# Patient Record
Sex: MALE | Race: WHITE | Employment: OTHER | URBAN - METROPOLITAN AREA
[De-identification: names, ages, dates, MRNs, and addresses within clinical notes are randomized per-mention and may not be internally consistent; named-entity substitution may affect disease eponyms.]

---

## 2017-08-17 ENCOUNTER — NURSE ONLY (OUTPATIENT)
Dept: FAMILY MEDICINE CLINIC | Facility: CLINIC | Age: 62
End: 2017-08-17

## 2017-08-17 VITALS
OXYGEN SATURATION: 96 % | TEMPERATURE: 98 F | HEART RATE: 74 BPM | SYSTOLIC BLOOD PRESSURE: 120 MMHG | RESPIRATION RATE: 20 BRPM | DIASTOLIC BLOOD PRESSURE: 74 MMHG

## 2017-08-17 DIAGNOSIS — J40 BRONCHITIS: ICD-10-CM

## 2017-08-17 DIAGNOSIS — J01.00 ACUTE NON-RECURRENT MAXILLARY SINUSITIS: Primary | ICD-10-CM

## 2017-08-17 PROCEDURE — 99203 OFFICE O/P NEW LOW 30 MIN: CPT

## 2017-08-17 RX ORDER — DIAZEPAM 5 MG/1
TABLET ORAL
COMMUNITY
Start: 2017-01-12 | End: 2019-02-05 | Stop reason: ALTCHOICE

## 2017-08-17 RX ORDER — CEFDINIR 300 MG/1
300 CAPSULE ORAL 2 TIMES DAILY
Qty: 20 CAPSULE | Refills: 0 | Status: SHIPPED | OUTPATIENT
Start: 2017-08-17 | End: 2017-08-27

## 2017-08-17 RX ORDER — DICLOFENAC SODIUM 75 MG/1
75 TABLET, DELAYED RELEASE ORAL
COMMUNITY
Start: 2017-03-29 | End: 2019-06-21

## 2017-08-17 NOTE — PROGRESS NOTES
CHIEF COMPLAINT:   Patient presents with:  Cough/URI: 8/11/17    HPI:   Jessi Reed is a 64year old male who presents with upper respiratory symptoms for  6  days.  Patient reports congestion, cough with yellow to brown tinged colored sputum, cough is n • CAD (coronary artery disease)     nonobstructive   • Depression    • DJD (degenerative joint disease)    • Erectile dysfunction    • Meniere's disease    • Spontaneous pneumothorax     age 19's and again age 29's, treated with chest tube on first episode NOSE: Nasal passages erythematous and swollen, maxil sinus tenderness bilat   THROAT: oral mucosa pink and moist; posterior pharynx red with central mild cobblestoning/mucoid; tonsils not present; no exudate   NECK: supple, non-tender  LUNGS: clear to ausc · Take the full course of antibiotics as instructed. Do not stop taking them, even if you feel better. · Drink plenty of water, hot tea, and other liquids. This may help thin mucus. It also may promote sinus drainage.   · Heat may help soothe painful areas Call your healthcare provider if any of these occur:  · Facial pain or headache becoming more severe  · Stiff neck  · Unusual drowsiness or confusion  · Swelling of the forehead or eyelids  · Vision problems, including blurred or double vision  · Fever of

## 2017-08-21 ENCOUNTER — OFFICE VISIT (OUTPATIENT)
Dept: FAMILY MEDICINE CLINIC | Facility: CLINIC | Age: 62
End: 2017-08-21

## 2017-08-21 VITALS
HEART RATE: 88 BPM | SYSTOLIC BLOOD PRESSURE: 102 MMHG | OXYGEN SATURATION: 97 % | RESPIRATION RATE: 20 BRPM | TEMPERATURE: 98 F | DIASTOLIC BLOOD PRESSURE: 62 MMHG

## 2017-08-21 DIAGNOSIS — J40 BRONCHITIS: Primary | ICD-10-CM

## 2017-08-21 PROCEDURE — 94640 AIRWAY INHALATION TREATMENT: CPT

## 2017-08-21 PROCEDURE — 99213 OFFICE O/P EST LOW 20 MIN: CPT

## 2017-08-21 RX ORDER — ALBUTEROL SULFATE 2.5 MG/3ML
2.5 SOLUTION RESPIRATORY (INHALATION) ONCE
Status: COMPLETED | OUTPATIENT
Start: 2017-08-21 | End: 2017-08-21

## 2017-08-21 RX ORDER — PREDNISONE 20 MG/1
TABLET ORAL
Qty: 12 TABLET | Refills: 0 | Status: SHIPPED | OUTPATIENT
Start: 2017-08-21 | End: 2018-06-18 | Stop reason: ALTCHOICE

## 2017-08-21 RX ORDER — DOXYCYCLINE HYCLATE 100 MG
100 TABLET ORAL 2 TIMES DAILY
Qty: 20 TABLET | Refills: 0 | Status: SHIPPED | OUTPATIENT
Start: 2017-08-21 | End: 2018-05-31 | Stop reason: ALTCHOICE

## 2017-08-21 RX ORDER — GUAIFENESIN AND CODEINE PHOSPHATE 100; 10 MG/5ML; MG/5ML
SOLUTION ORAL
Qty: 180 ML | Refills: 0 | Status: SHIPPED | OUTPATIENT
Start: 2017-08-21 | End: 2018-05-31 | Stop reason: ALTCHOICE

## 2017-08-21 RX ADMIN — ALBUTEROL SULFATE 2.5 MG: 2.5 SOLUTION RESPIRATORY (INHALATION) at 18:00:00

## 2017-08-22 ENCOUNTER — TELEPHONE (OUTPATIENT)
Dept: FAMILY MEDICINE CLINIC | Facility: CLINIC | Age: 62
End: 2017-08-22

## 2017-08-22 NOTE — PROGRESS NOTES
CHIEF COMPLAINT:   Patient presents with:  Cough/URI: since8/11/17    HPI:   Azucena Perez is a 64year old male who presents with upper respiratory symptoms for  11  days.  Patient reports cough with white to yellowish colored sputum, cough is keeping pt Tab Take 1 tablet by mouth daily as needed for Erectile Dysfunction. Disp: 30 tablet Rfl: 1   Probiotic Product Oral Cap Take 2 capsules by mouth daily.  Disp: 60 capsule Rfl: 0   ASPIRIN 81 MG OR TABS 1 TABLET DAILY Disp:  Rfl:       Past Medical History: SpO2 97%    6:30PM Pulse  Ox 96% after Albuterol Neb 0.083%  GENERAL: well developed, well nourished,in no apparent distress.  Able to speak in complete sentences/ no conversational dyspnea  SKIN: no rashes,no suspicious lesions  EYES: conjunctiva non-injec Minimum per day. Risks, benefits, and side effects of medication explained and discussed.     Patient Instructions   Discontinue your antibiotic, Cefdinir and start new one, Doxycycline,  Use Prednisone over the next 6 days, Continue to use the Albuterol

## 2017-08-22 NOTE — PATIENT INSTRUCTIONS
Discontinue your antibiotic, Cefdinir and start new one, Doxycycline,  Use Prednisone over the next 6 days, Continue to use the Albuterol inhaler 4 times a day for the cough. Use cough syrup at night.   Follow up with PCP in 2 days, as you may need a CXR,

## 2017-08-22 NOTE — TELEPHONE ENCOUNTER
Pt states he slept better last night and that he feels a little better. He took the Prednisone 60mg and then took it again 12 hours later, instead of waiting until 24 hours from prior dosing.  Instructed pt that he needs to doni his PCP today for a follow u

## 2017-12-16 ENCOUNTER — OFFICE VISIT (OUTPATIENT)
Dept: FAMILY MEDICINE CLINIC | Facility: CLINIC | Age: 62
End: 2017-12-16

## 2017-12-16 VITALS
OXYGEN SATURATION: 98 % | DIASTOLIC BLOOD PRESSURE: 72 MMHG | TEMPERATURE: 98 F | RESPIRATION RATE: 22 BRPM | HEART RATE: 88 BPM | SYSTOLIC BLOOD PRESSURE: 118 MMHG

## 2017-12-16 DIAGNOSIS — J01.00 ACUTE NON-RECURRENT MAXILLARY SINUSITIS: ICD-10-CM

## 2017-12-16 DIAGNOSIS — Z91.09 ENVIRONMENTAL ALLERGIES: ICD-10-CM

## 2017-12-16 DIAGNOSIS — J40 BRONCHITIS: Primary | ICD-10-CM

## 2017-12-16 PROCEDURE — 99213 OFFICE O/P EST LOW 20 MIN: CPT

## 2017-12-16 RX ORDER — CEFDINIR 300 MG/1
300 CAPSULE ORAL 2 TIMES DAILY
Qty: 20 CAPSULE | Refills: 0 | Status: SHIPPED | OUTPATIENT
Start: 2017-12-16 | End: 2017-12-26

## 2017-12-16 RX ORDER — GUAIFENESIN AND CODEINE PHOSPHATE 100; 10 MG/5ML; MG/5ML
SOLUTION ORAL
Qty: 150 ML | Refills: 0 | Status: SHIPPED | OUTPATIENT
Start: 2017-12-16 | End: 2018-05-31 | Stop reason: ALTCHOICE

## 2017-12-16 NOTE — PROGRESS NOTES
CHIEF COMPLAINT:   Patient presents with:  Cough/URI: 1 week    HPI:   Skye Terry is a 58year old male who presents with upper respiratory symptoms for  1  weeks, and cough is getting worse.  Add a sever episode like this in August, 2017 with \"mild pn Sildenafil Citrate (VIAGRA) 50 MG Oral Tab Take 1 tablet by mouth daily as needed for Erectile Dysfunction.  Disp: 30 tablet Rfl: 1      Past Medical History:   Diagnosis Date   • BPH    • CAD (coronary artery disease)     nonobstructive   • Depression    • EYES: conjunctiva non-injected; no drainage  EARS: TM's -hazy, not red, mild bulging, no retraction, opaque to hazy fluid, bony landmarks are not clearly visible  NOSE: Nasal passages erythematous and swollen with maxillary sinus pressure/pain  THROAT: ora As the link between the middle ear and the throat, the eustachian tube has 2 roles. It helps drain normal, cleansing moisture from the middle ear. It also controls air pressure inside the middle ear chamber. When you swallow, the eustachian tube opens.  Cleatrice Reasons · Talk with your healthcare provider about starting a regular exercise program. Exercise is a great way to reduce stress.  Include an aerobic activity (such as walking, jogging, bicycling, or swimming) in your exercise program.  · Take time out from your da · Ask your healthcare provider about visualization techniques, deep-breathing exercises, stretching, yoga, meditation, and biofeedback. These are all ways to help reduce stress. Date Last Reviewed: 11/1/2016  © 6398-6843 [employer] [address].  [address] A physical exam, health history, and certain tests help your healthcare provider make the diagnosis. Health history  Your healthcare provider will ask you about your symptoms. The exam  Your provider listens Meaghan Balderas chest for signs of congestion.  He or s Most cases of bronchitis are caused by cold or flu viruses. They don’t need antibiotics to treat them, even if your mucus is thick and green or yellow.  Antibiotics don’t treat viral illness and antibiotics have not been shown to have any benefit in cases o © 0454-1924 [employer] [address]. 1407 [facility], [facility] [place]. All rights reserved. This information is not intended as a substitute for professional medical care. Always follow your healthcare professional's instructions.       Use Albut

## 2017-12-16 NOTE — PATIENT INSTRUCTIONS
The Middle Ear  The middle ear is an air-filled chamber that lies behind the eardrum. Pressure in the middle ear changes to match air pressure outside of the eardrum.  When inside and outside pressures are balanced, the eardrum is flexible and normal hear Certain changes may help you manage Meniere’s disease. Some of these changes are minor. Others require more dedication, such as cutting down on stress.   Reduce Your Stress  Stress doesn’t cause Meniere’s disease, but it may cause symptoms or make them wors · Pay attention to what makes you feel tense. Note how your body responds to tension. “Listen” to your body for signs such as stomach upsets, tensed muscles, clenched teeth, or other symptoms.   · Talk with your healthcare provider about starting a regular Bronchitis often occurs with a cold or the flu virus. The airways become inflamed (red and swollen). There is a deep hacking cough from the extra mucus.  Other symptoms may include:  · Wheezing  · Chest discomfort  · Shortness of breath  · Mild fever  A sec · Sit up or use extra pillows when in bed. This helps to lessen coughing and congestion. · Ask your provider about using medicine. Ask about using cough medicine, pain and fever medicine, or a decongestant.   Antibiotics  Most cases of bronchitis are cause · Breathing problems worsen or  become severe  · Symptoms don’t get better within a week, or within 3 days of taking antibiotics   Date Last Reviewed: 2/1/2017  © 8216-3158 [employer] [address]. 1407 [facility], [address] [place] [address].  All rights re

## 2018-06-12 PROBLEM — R10.31 RIGHT INGUINAL PAIN: Status: ACTIVE | Noted: 2018-06-12

## 2018-06-18 ENCOUNTER — NURSE ONLY (OUTPATIENT)
Dept: FAMILY MEDICINE CLINIC | Facility: CLINIC | Age: 63
End: 2018-06-18

## 2018-06-18 VITALS
SYSTOLIC BLOOD PRESSURE: 108 MMHG | HEART RATE: 72 BPM | DIASTOLIC BLOOD PRESSURE: 62 MMHG | RESPIRATION RATE: 14 BRPM | OXYGEN SATURATION: 98 %

## 2018-06-18 DIAGNOSIS — L03.011 ACUTE PARONYCHIA OF FINGER, RIGHT: Primary | ICD-10-CM

## 2018-06-18 PROCEDURE — 99212 OFFICE O/P EST SF 10 MIN: CPT | Performed by: NURSE PRACTITIONER

## 2018-06-18 RX ORDER — CEPHALEXIN 500 MG/1
500 CAPSULE ORAL 3 TIMES DAILY
Qty: 21 CAPSULE | Refills: 0 | Status: SHIPPED | OUTPATIENT
Start: 2018-06-18 | End: 2018-06-25

## 2018-06-18 NOTE — PATIENT INSTRUCTIONS
Paronychia of the Finger or Toe  Paronychia is an infection near a fingernail or toenail. It usually occurs when an opening in the cuticle or an ingrown toenail lets bacteria under the skin. The infection will need to be drained if pus is present.  If th · Fever of 100.4ºF (38ºC) or higher, or as directed by your provider  Date Last Reviewed: 8/1/2016  © 0708-5272 [employer] [address]. 1407 [facility], [address] [place] [address]. All rights reserved.  This information is not intended as a substitute for

## 2018-06-18 NOTE — PROGRESS NOTES
CHIEF COMPLAINT:   Patient presents with:  Rash Skin Problem (integumentary)      HPI:     Claudia De La Cruz is a 58year old male who presents with concerns of skin infection of the right 2nd finger along the nailbed.  Patient first noticed symptoms approxima Smokeless tobacco: Never Used                      Alcohol use: Yes              Comment: COUPLE TIMES PER YEAR       REVIEW OF SYSTEMS:   GENERAL: feels well otherwise, no fever, no chills. SKIN: as above. LYMPH: denies enlargement of the lymph nodes. The infection will need to be drained if pus is present. If the infection has been caught early, you may need only antibiotic treatment. Healing will take about 1 to 2 weeks.   Home care  Follow these guidelines when caring for yourself at home:  · Clean an © 7548-0364 [employer]. 1407 [facility], [facility] [place]. All rights reserved. This information is not intended as a substitute for professional medical care. Always follow your healthcare professional's instructions.             The

## 2018-10-22 ENCOUNTER — OFFICE VISIT (OUTPATIENT)
Dept: FAMILY MEDICINE CLINIC | Facility: CLINIC | Age: 63
End: 2018-10-22
Payer: COMMERCIAL

## 2018-10-22 VITALS
DIASTOLIC BLOOD PRESSURE: 72 MMHG | HEART RATE: 69 BPM | OXYGEN SATURATION: 97 % | SYSTOLIC BLOOD PRESSURE: 120 MMHG | TEMPERATURE: 98 F

## 2018-10-22 DIAGNOSIS — J02.8 ACUTE PHARYNGITIS DUE TO OTHER SPECIFIED ORGANISMS: Primary | ICD-10-CM

## 2018-10-22 PROCEDURE — 99213 OFFICE O/P EST LOW 20 MIN: CPT

## 2018-10-22 PROCEDURE — 87081 CULTURE SCREEN ONLY: CPT

## 2018-10-22 PROCEDURE — 87880 STREP A ASSAY W/OPTIC: CPT

## 2018-10-22 RX ORDER — AMOXICILLIN 875 MG/1
875 TABLET, COATED ORAL 2 TIMES DAILY
Qty: 20 TABLET | Refills: 0 | Status: SHIPPED | OUTPATIENT
Start: 2018-10-22 | End: 2019-02-05 | Stop reason: ALTCHOICE

## 2018-10-23 NOTE — PROGRESS NOTES
Marielena Gil is a 58year old male. CHIEF COMPLAINT:   Patient presents with:  Sore Throat: 10/18/18, now with tender glands, HA and chest congestion      HPI:   Patient presents with 3-4 day history of sore throat.   Patient reports the following associ Sig: Take 1 tablet (875 mg total) by mouth 2 (two) times daily.        Comfort measures explained and discussed as listed in Patient Instructions    Follow up in 3-5 days if not improving, condition worsens, or fever greater than or equal to 100.4 persist · Use the antibiotic medicine for the full 10 days. Do not stop the medicine even if you or your child feel better. This is very important to make sure the infection is fully treated. It is also important to prevent medicine-resistant germs from growing.  I ? Your child is younger than 3years of age and has a fever of 100.4°F (38°C) for more than 1 day. ? Your child is 3years old or older and has a fever of 100.4°F (38°C) for more than 3 days.   · New or worsening ear pain, sinus pain, or headache  · Painfu

## 2018-12-07 ENCOUNTER — HOSPITAL ENCOUNTER (EMERGENCY)
Facility: HOSPITAL | Age: 63
Discharge: HOME | End: 2018-12-07
Attending: EMERGENCY MEDICINE
Payer: COMMERCIAL

## 2018-12-07 ENCOUNTER — OFFICE VISIT (OUTPATIENT)
Dept: FAMILY MEDICINE CLINIC | Facility: CLINIC | Age: 63
End: 2018-12-07
Payer: COMMERCIAL

## 2018-12-07 ENCOUNTER — APPOINTMENT (OUTPATIENT)
Dept: GENERAL RADIOLOGY | Facility: HOSPITAL | Age: 63
End: 2018-12-07
Attending: EMERGENCY MEDICINE
Payer: COMMERCIAL

## 2018-12-07 VITALS
OXYGEN SATURATION: 98 % | HEART RATE: 60 BPM | SYSTOLIC BLOOD PRESSURE: 120 MMHG | TEMPERATURE: 98 F | RESPIRATION RATE: 16 BRPM | DIASTOLIC BLOOD PRESSURE: 70 MMHG

## 2018-12-07 VITALS
HEIGHT: 69 IN | OXYGEN SATURATION: 98 % | SYSTOLIC BLOOD PRESSURE: 121 MMHG | WEIGHT: 218 LBS | BODY MASS INDEX: 32.29 KG/M2 | HEART RATE: 62 BPM | DIASTOLIC BLOOD PRESSURE: 81 MMHG | RESPIRATION RATE: 21 BRPM | TEMPERATURE: 99 F

## 2018-12-07 DIAGNOSIS — Z02.9 ENCOUNTERS FOR ADMINISTRATIVE PURPOSE: Primary | ICD-10-CM

## 2018-12-07 DIAGNOSIS — J40 BRONCHITIS: Primary | ICD-10-CM

## 2018-12-07 PROCEDURE — 71046 X-RAY EXAM CHEST 2 VIEWS: CPT | Performed by: EMERGENCY MEDICINE

## 2018-12-07 PROCEDURE — 93010 ELECTROCARDIOGRAM REPORT: CPT | Performed by: EMERGENCY MEDICINE

## 2018-12-07 PROCEDURE — 93005 ELECTROCARDIOGRAM TRACING: CPT

## 2018-12-07 PROCEDURE — 99284 EMERGENCY DEPT VISIT MOD MDM: CPT

## 2018-12-07 RX ORDER — CEPHALEXIN 500 MG/1
500 CAPSULE ORAL 3 TIMES DAILY
Qty: 21 CAPSULE | Refills: 0 | Status: SHIPPED | OUTPATIENT
Start: 2018-12-07 | End: 2018-12-14

## 2018-12-07 NOTE — ED INITIAL ASSESSMENT (HPI)
Sent from [address] [place] [address] for \"chest tightness\" experienced when coughing. Pt states he has been around sick people recently, his mother in law has pneumonia, and he has been experiencing chest congestion, productive coughing and SOB.  Hx pneumothorax

## 2018-12-07 NOTE — ED PROVIDER NOTES
Patient Seen in: [facility] AND [facility] Emergency Department    History   Patient presents with:  Cough/URI    Stated Complaint: chest tightness, sent from [facility]    HPI    Patient is a 58-year-old male who presents to the emergency department with a chief 140/78   Pulse 63   Resp 22   Temp 98.6 °F (37 °C)   Temp src    SpO2 98 %   O2 Device None (Room air)       Current:/78   Pulse 63   Temp 98.6 °F (37 °C)   Resp 22   Ht 175.3 cm (5' 9\")   Wt 98.9 kg   SpO2 98%   BMI 32.19 kg/m²         Physical Exa

## 2018-12-07 NOTE — PROGRESS NOTES
CC/HPI  Ad Carlton is a 58year old male who presents with URI symptoms and left sided chest discomfort/pain for 3 days, denies significant SOB. Individual has significant PMH of spontaneous pneumothorax x 2.      ROS  All relevant systems reviewed

## 2019-02-08 PROBLEM — F33.1 MODERATE EPISODE OF RECURRENT MAJOR DEPRESSIVE DISORDER (HCC): Status: ACTIVE | Noted: 2019-02-08

## 2019-02-08 PROBLEM — G89.29 CHRONIC PAIN OF BOTH SHOULDERS: Status: ACTIVE | Noted: 2019-02-08

## 2019-02-08 PROBLEM — M25.512 CHRONIC PAIN OF BOTH SHOULDERS: Status: ACTIVE | Noted: 2019-02-08

## 2019-02-08 PROBLEM — M25.511 CHRONIC PAIN OF BOTH SHOULDERS: Status: ACTIVE | Noted: 2019-02-08

## 2019-02-22 ENCOUNTER — HOSPITAL ENCOUNTER (EMERGENCY)
Facility: HOSPITAL | Age: 64
Discharge: HOME | End: 2019-02-22
Attending: EMERGENCY MEDICINE
Payer: OTHER MISCELLANEOUS

## 2019-02-22 ENCOUNTER — APPOINTMENT (OUTPATIENT)
Dept: CT IMAGING | Facility: HOSPITAL | Age: 64
End: 2019-02-22
Attending: EMERGENCY MEDICINE
Payer: OTHER MISCELLANEOUS

## 2019-02-22 ENCOUNTER — APPOINTMENT (OUTPATIENT)
Dept: GENERAL RADIOLOGY | Facility: HOSPITAL | Age: 64
End: 2019-02-22
Attending: EMERGENCY MEDICINE
Payer: OTHER MISCELLANEOUS

## 2019-02-22 VITALS
DIASTOLIC BLOOD PRESSURE: 79 MMHG | TEMPERATURE: 98 F | WEIGHT: 227 LBS | HEIGHT: 69 IN | OXYGEN SATURATION: 98 % | RESPIRATION RATE: 20 BRPM | BODY MASS INDEX: 33.62 KG/M2 | SYSTOLIC BLOOD PRESSURE: 127 MMHG | HEART RATE: 57 BPM

## 2019-02-22 DIAGNOSIS — S50.01XA CONTUSION OF RIGHT ELBOW, INITIAL ENCOUNTER: ICD-10-CM

## 2019-02-22 DIAGNOSIS — S09.90XA INJURY OF HEAD, INITIAL ENCOUNTER: Primary | ICD-10-CM

## 2019-02-22 DIAGNOSIS — S00.03XA CONTUSION OF SCALP, INITIAL ENCOUNTER: ICD-10-CM

## 2019-02-22 DIAGNOSIS — S16.1XXA STRAIN OF NECK MUSCLE, INITIAL ENCOUNTER: ICD-10-CM

## 2019-02-22 PROCEDURE — 73080 X-RAY EXAM OF ELBOW: CPT | Performed by: EMERGENCY MEDICINE

## 2019-02-22 PROCEDURE — 72125 CT NECK SPINE W/O DYE: CPT | Performed by: EMERGENCY MEDICINE

## 2019-02-22 PROCEDURE — 70450 CT HEAD/BRAIN W/O DYE: CPT | Performed by: EMERGENCY MEDICINE

## 2019-02-22 PROCEDURE — 99284 EMERGENCY DEPT VISIT MOD MDM: CPT

## 2019-02-22 RX ORDER — ACETAMINOPHEN 325 MG/1
650 TABLET ORAL ONCE
Status: COMPLETED | OUTPATIENT
Start: 2019-02-22 | End: 2019-02-22

## 2019-02-22 NOTE — ED INITIAL ASSESSMENT (HPI)
Patient fell 2 feet from step ladder onto right side of body. States he hit his head and felt the \"curtains closing\" but never lost consciousness. No anticoagulants. No chest pain. No sob.

## 2019-02-22 NOTE — ED NOTES
Patient states he has a headache 5/10 and swollen lump on back of head. Right elbow pain 7/10. Right shoulder pain 5/10. Alert and oriented.

## 2019-02-22 NOTE — ED PROVIDER NOTES
Patient Seen in: [facility] AND [facility] Emergency Department    History   Patient presents with:  Fall (musculoskeletal, neurologic)    Stated Complaint: fall    HPI    Patient is a 17-year-old male who presents to the emergency department after falling from Temp src 02/22/19 0758 Oral   SpO2 02/22/19 0758 99 %   O2 Device 02/22/19 0758 None (Room air)       Current:/79   Pulse 57   Temp 98.1 °F (36.7 °C) (Oral)   Resp 20   Ht 175.3 cm (5' 9\")   Wt 103 kg   SpO2 98%   BMI 33.52 kg/m²         Physical Ex

## 2019-02-22 NOTE — ED NOTES
Patient sat up to go to bathroom and experienced dizziness. Needed to rest before ambulating. Ambulated to bathroom and voided.  Still dizzy when back in bed

## 2019-07-22 ENCOUNTER — APPOINTMENT (OUTPATIENT)
Dept: LAB | Age: 64
End: 2019-07-22
Attending: ORTHOPAEDIC SURGERY
Payer: COMMERCIAL

## 2019-07-22 DIAGNOSIS — Z01.818 PREOP TESTING: ICD-10-CM

## 2019-07-22 LAB
ANION GAP SERPL CALC-SCNC: 7 MMOL/L (ref 0–18)
BUN BLD-MCNC: 24 MG/DL (ref 7–18)
BUN/CREAT SERPL: 19.8 (ref 10–20)
CALCIUM BLD-MCNC: 9.5 MG/DL (ref 8.5–10.1)
CHLORIDE SERPL-SCNC: 107 MMOL/L (ref 98–112)
CO2 SERPL-SCNC: 28 MMOL/L (ref 21–32)
CREAT BLD-MCNC: 1.21 MG/DL (ref 0.7–1.3)
GLUCOSE BLD-MCNC: 104 MG/DL (ref 70–99)
OSMOLALITY SERPL CALC.SUM OF ELEC: 298 MOSM/KG (ref 275–295)
PATIENT FASTING: NO
POTASSIUM SERPL-SCNC: 4 MMOL/L (ref 3.5–5.1)
SODIUM SERPL-SCNC: 142 MMOL/L (ref 136–145)

## 2019-07-22 PROCEDURE — 87641 MR-STAPH DNA AMP PROBE: CPT

## 2019-07-22 PROCEDURE — 36415 COLL VENOUS BLD VENIPUNCTURE: CPT

## 2019-07-22 PROCEDURE — 80048 BASIC METABOLIC PNL TOTAL CA: CPT

## 2019-07-23 LAB — MRSA DNA SPEC QL NAA+PROBE: NEGATIVE

## 2019-07-29 ENCOUNTER — ANESTHESIA EVENT (OUTPATIENT)
Dept: SURGERY | Facility: HOSPITAL | Age: 64
DRG: 483 | End: 2019-07-29
Payer: COMMERCIAL

## 2019-07-29 ENCOUNTER — HOSPITAL ENCOUNTER (INPATIENT)
Facility: HOSPITAL | Age: 64
LOS: 1 days | Discharge: HOME | DRG: 483 | End: 2019-07-30
Attending: ORTHOPAEDIC SURGERY | Admitting: ORTHOPAEDIC SURGERY
Payer: COMMERCIAL

## 2019-07-29 ENCOUNTER — ANESTHESIA (OUTPATIENT)
Dept: SURGERY | Facility: HOSPITAL | Age: 64
DRG: 483 | End: 2019-07-29
Payer: COMMERCIAL

## 2019-07-29 DIAGNOSIS — M19.012 GLENOHUMERAL ARTHRITIS, LEFT: ICD-10-CM

## 2019-07-29 DIAGNOSIS — Z01.818 PREOP TESTING: Primary | ICD-10-CM

## 2019-07-29 PROBLEM — H81.09 MENIERE'S DISEASE: Status: ACTIVE | Noted: 2019-07-29

## 2019-07-29 PROBLEM — I10 HTN (HYPERTENSION): Status: ACTIVE | Noted: 2019-07-29

## 2019-07-29 PROBLEM — F32.A DEPRESSION: Status: ACTIVE | Noted: 2019-07-29

## 2019-07-29 PROBLEM — R10.31 RIGHT INGUINAL PAIN: Status: RESOLVED | Noted: 2018-06-12 | Resolved: 2019-07-29

## 2019-07-29 PROBLEM — F32.A DEPRESSION: Status: RESOLVED | Noted: 2019-07-29 | Resolved: 2019-07-29

## 2019-07-29 PROCEDURE — 88311 DECALCIFY TISSUE: CPT | Performed by: ORTHOPAEDIC SURGERY

## 2019-07-29 PROCEDURE — 88305 TISSUE EXAM BY PATHOLOGIST: CPT | Performed by: ORTHOPAEDIC SURGERY

## 2019-07-29 PROCEDURE — 64415 NJX AA&/STRD BRCH PLXS IMG: CPT | Performed by: ORTHOPAEDIC SURGERY

## 2019-07-29 PROCEDURE — 76942 ECHO GUIDE FOR BIOPSY: CPT | Performed by: ORTHOPAEDIC SURGERY

## 2019-07-29 PROCEDURE — 0RRK0JZ REPLACEMENT OF LEFT SHOULDER JOINT WITH SYNTHETIC SUBSTITUTE, OPEN APPROACH: ICD-10-PCS | Performed by: ORTHOPAEDIC SURGERY

## 2019-07-29 PROCEDURE — 0LS40ZZ REPOSITION LEFT UPPER ARM TENDON, OPEN APPROACH: ICD-10-PCS | Performed by: ORTHOPAEDIC SURGERY

## 2019-07-29 PROCEDURE — 99152 MOD SED SAME PHYS/QHP 5/>YRS: CPT | Performed by: ORTHOPAEDIC SURGERY

## 2019-07-29 DEVICE — CEMENT BONE RADIOPAQ HOWMEDICA: Type: IMPLANTABLE DEVICE | Site: SHOULDER | Status: FUNCTIONAL

## 2019-07-29 RX ORDER — HYDROMORPHONE HYDROCHLORIDE 1 MG/ML
0.6 INJECTION, SOLUTION INTRAMUSCULAR; INTRAVENOUS; SUBCUTANEOUS EVERY 5 MIN PRN
Status: DISCONTINUED | OUTPATIENT
Start: 2019-07-29 | End: 2019-07-29 | Stop reason: HOSPADM

## 2019-07-29 RX ORDER — ACETAMINOPHEN 500 MG
1000 TABLET ORAL ONCE
Status: COMPLETED | OUTPATIENT
Start: 2019-07-29 | End: 2019-07-29

## 2019-07-29 RX ORDER — HALOPERIDOL 5 MG/ML
0.25 INJECTION INTRAMUSCULAR ONCE AS NEEDED
Status: DISCONTINUED | OUTPATIENT
Start: 2019-07-29 | End: 2019-07-29 | Stop reason: HOSPADM

## 2019-07-29 RX ORDER — LIDOCAINE HYDROCHLORIDE 10 MG/ML
INJECTION, SOLUTION INFILTRATION; PERINEURAL
Status: COMPLETED | OUTPATIENT
Start: 2019-07-29 | End: 2019-07-29

## 2019-07-29 RX ORDER — AMITRIPTYLINE HYDROCHLORIDE 50 MG/1
100 TABLET, FILM COATED ORAL NIGHTLY
Status: DISCONTINUED | OUTPATIENT
Start: 2019-07-29 | End: 2019-07-30

## 2019-07-29 RX ORDER — GLYCOPYRROLATE 0.2 MG/ML
INJECTION INTRAMUSCULAR; INTRAVENOUS AS NEEDED
Status: DISCONTINUED | OUTPATIENT
Start: 2019-07-29 | End: 2019-07-29 | Stop reason: SURG

## 2019-07-29 RX ORDER — OXYCODONE HYDROCHLORIDE 5 MG/1
5 TABLET ORAL EVERY 4 HOURS PRN
Status: DISCONTINUED | OUTPATIENT
Start: 2019-07-29 | End: 2019-07-30

## 2019-07-29 RX ORDER — ACETAMINOPHEN 500 MG
1000 TABLET ORAL EVERY 8 HOURS
Status: DISPENSED | OUTPATIENT
Start: 2019-07-29 | End: 2019-07-30

## 2019-07-29 RX ORDER — MORPHINE SULFATE 2 MG/ML
2 INJECTION, SOLUTION INTRAMUSCULAR; INTRAVENOUS EVERY 10 MIN PRN
Status: DISCONTINUED | OUTPATIENT
Start: 2019-07-29 | End: 2019-07-29 | Stop reason: HOSPADM

## 2019-07-29 RX ORDER — CEFAZOLIN SODIUM/WATER 2 G/20 ML
2 SYRINGE (ML) INTRAVENOUS ONCE
Status: DISCONTINUED | OUTPATIENT
Start: 2019-07-29 | End: 2019-07-29 | Stop reason: HOSPADM

## 2019-07-29 RX ORDER — ROPIVACAINE HYDROCHLORIDE 5 MG/ML
INJECTION, SOLUTION EPIDURAL; INFILTRATION; PERINEURAL
Status: COMPLETED | OUTPATIENT
Start: 2019-07-29 | End: 2019-07-29

## 2019-07-29 RX ORDER — METOCLOPRAMIDE HYDROCHLORIDE 5 MG/ML
10 INJECTION INTRAMUSCULAR; INTRAVENOUS EVERY 6 HOURS PRN
Status: DISCONTINUED | OUTPATIENT
Start: 2019-07-29 | End: 2019-07-30

## 2019-07-29 RX ORDER — SERTRALINE HYDROCHLORIDE 100 MG/1
100 TABLET, FILM COATED ORAL DAILY
Status: DISCONTINUED | OUTPATIENT
Start: 2019-07-30 | End: 2019-07-30

## 2019-07-29 RX ORDER — HYDROMORPHONE HYDROCHLORIDE 1 MG/ML
0.4 INJECTION, SOLUTION INTRAMUSCULAR; INTRAVENOUS; SUBCUTANEOUS EVERY 5 MIN PRN
Status: DISCONTINUED | OUTPATIENT
Start: 2019-07-29 | End: 2019-07-29 | Stop reason: HOSPADM

## 2019-07-29 RX ORDER — SODIUM CHLORIDE 0.9 % (FLUSH) 0.9 %
10 SYRINGE (ML) INJECTION AS NEEDED
Status: DISCONTINUED | OUTPATIENT
Start: 2019-07-29 | End: 2019-07-30

## 2019-07-29 RX ORDER — ALFUZOSIN HYDROCHLORIDE 10 MG/1
10 TABLET, EXTENDED RELEASE ORAL
Status: DISCONTINUED | OUTPATIENT
Start: 2019-07-30 | End: 2019-07-30

## 2019-07-29 RX ORDER — MECLIZINE HYDROCHLORIDE 25 MG/1
25 TABLET ORAL 3 TIMES DAILY PRN
Status: DISCONTINUED | OUTPATIENT
Start: 2019-07-29 | End: 2019-07-30

## 2019-07-29 RX ORDER — MORPHINE SULFATE 4 MG/ML
4 INJECTION, SOLUTION INTRAMUSCULAR; INTRAVENOUS EVERY 2 HOUR PRN
Status: DISCONTINUED | OUTPATIENT
Start: 2019-07-29 | End: 2019-07-30

## 2019-07-29 RX ORDER — BISACODYL 10 MG
10 SUPPOSITORY, RECTAL RECTAL
Status: DISCONTINUED | OUTPATIENT
Start: 2019-07-29 | End: 2019-07-30

## 2019-07-29 RX ORDER — MIDAZOLAM HYDROCHLORIDE 1 MG/ML
INJECTION INTRAMUSCULAR; INTRAVENOUS
Status: COMPLETED | OUTPATIENT
Start: 2019-07-29 | End: 2019-07-29

## 2019-07-29 RX ORDER — OXYCODONE HYDROCHLORIDE 5 MG/1
10 TABLET ORAL EVERY 4 HOURS PRN
Status: DISCONTINUED | OUTPATIENT
Start: 2019-07-29 | End: 2019-07-30

## 2019-07-29 RX ORDER — VANCOMYCIN HYDROCHLORIDE 1 G/20ML
INJECTION, POWDER, LYOPHILIZED, FOR SOLUTION INTRAVENOUS AS NEEDED
Status: DISCONTINUED | OUTPATIENT
Start: 2019-07-29 | End: 2019-07-29 | Stop reason: HOSPADM

## 2019-07-29 RX ORDER — DEXAMETHASONE SODIUM PHOSPHATE 4 MG/ML
VIAL (ML) INJECTION AS NEEDED
Status: DISCONTINUED | OUTPATIENT
Start: 2019-07-29 | End: 2019-07-29 | Stop reason: SURG

## 2019-07-29 RX ORDER — SODIUM CHLORIDE, SODIUM LACTATE, POTASSIUM CHLORIDE, CALCIUM CHLORIDE 600; 310; 30; 20 MG/100ML; MG/100ML; MG/100ML; MG/100ML
INJECTION, SOLUTION INTRAVENOUS CONTINUOUS
Status: DISCONTINUED | OUTPATIENT
Start: 2019-07-29 | End: 2019-07-29 | Stop reason: HOSPADM

## 2019-07-29 RX ORDER — PROCHLORPERAZINE EDISYLATE 5 MG/ML
10 INJECTION INTRAMUSCULAR; INTRAVENOUS EVERY 6 HOURS PRN
Status: DISCONTINUED | OUTPATIENT
Start: 2019-07-29 | End: 2019-07-30

## 2019-07-29 RX ORDER — HYDROCODONE BITARTRATE AND ACETAMINOPHEN 5; 325 MG/1; MG/1
1 TABLET ORAL EVERY 6 HOURS PRN
Qty: 40 TABLET | Refills: 0 | Status: SHIPPED | OUTPATIENT
Start: 2019-07-29 | End: 2019-07-29

## 2019-07-29 RX ORDER — MORPHINE SULFATE 4 MG/ML
4 INJECTION, SOLUTION INTRAMUSCULAR; INTRAVENOUS EVERY 10 MIN PRN
Status: DISCONTINUED | OUTPATIENT
Start: 2019-07-29 | End: 2019-07-29 | Stop reason: HOSPADM

## 2019-07-29 RX ORDER — HYDROCODONE BITARTRATE AND ACETAMINOPHEN 5; 325 MG/1; MG/1
2 TABLET ORAL AS NEEDED
Status: DISCONTINUED | OUTPATIENT
Start: 2019-07-29 | End: 2019-07-29 | Stop reason: HOSPADM

## 2019-07-29 RX ORDER — ACETAMINOPHEN 325 MG/1
650 TABLET ORAL EVERY 4 HOURS PRN
Status: DISCONTINUED | OUTPATIENT
Start: 2019-07-29 | End: 2019-07-30

## 2019-07-29 RX ORDER — CEFAZOLIN SODIUM/WATER 2 G/20 ML
2 SYRINGE (ML) INTRAVENOUS EVERY 8 HOURS
Status: COMPLETED | OUTPATIENT
Start: 2019-07-29 | End: 2019-07-30

## 2019-07-29 RX ORDER — NALOXONE HYDROCHLORIDE 0.4 MG/ML
80 INJECTION, SOLUTION INTRAMUSCULAR; INTRAVENOUS; SUBCUTANEOUS AS NEEDED
Status: DISCONTINUED | OUTPATIENT
Start: 2019-07-29 | End: 2019-07-29 | Stop reason: HOSPADM

## 2019-07-29 RX ORDER — CEFAZOLIN SODIUM/WATER 2 G/20 ML
SYRINGE (ML) INTRAVENOUS AS NEEDED
Status: DISCONTINUED | OUTPATIENT
Start: 2019-07-29 | End: 2019-07-29 | Stop reason: SURG

## 2019-07-29 RX ORDER — MORPHINE SULFATE 15 MG/1
15 TABLET ORAL EVERY 4 HOURS PRN
Status: DISCONTINUED | OUTPATIENT
Start: 2019-07-29 | End: 2019-07-30

## 2019-07-29 RX ORDER — SODIUM PHOSPHATE, DIBASIC AND SODIUM PHOSPHATE, MONOBASIC 7; 19 G/133ML; G/133ML
1 ENEMA RECTAL ONCE AS NEEDED
Status: DISCONTINUED | OUTPATIENT
Start: 2019-07-29 | End: 2019-07-30

## 2019-07-29 RX ORDER — DOCUSATE SODIUM 100 MG/1
100 CAPSULE, LIQUID FILLED ORAL 2 TIMES DAILY
Status: DISCONTINUED | OUTPATIENT
Start: 2019-07-29 | End: 2019-07-30

## 2019-07-29 RX ORDER — FAMOTIDINE 20 MG/1
20 TABLET ORAL ONCE
Status: COMPLETED | OUTPATIENT
Start: 2019-07-29 | End: 2019-07-29

## 2019-07-29 RX ORDER — NEOSTIGMINE METHYLSULFATE 0.5 MG/ML
INJECTION INTRAVENOUS AS NEEDED
Status: DISCONTINUED | OUTPATIENT
Start: 2019-07-29 | End: 2019-07-29 | Stop reason: SURG

## 2019-07-29 RX ORDER — MORPHINE SULFATE 4 MG/ML
6 INJECTION, SOLUTION INTRAMUSCULAR; INTRAVENOUS EVERY 2 HOUR PRN
Status: DISCONTINUED | OUTPATIENT
Start: 2019-07-29 | End: 2019-07-30

## 2019-07-29 RX ORDER — HYDROCODONE BITARTRATE AND ACETAMINOPHEN 5; 325 MG/1; MG/1
1 TABLET ORAL EVERY 6 HOURS PRN
Qty: 40 TABLET | Refills: 0 | Status: SHIPPED | OUTPATIENT
Start: 2019-07-29 | End: 2019-08-13 | Stop reason: ALTCHOICE

## 2019-07-29 RX ORDER — LIDOCAINE HYDROCHLORIDE 10 MG/ML
INJECTION, SOLUTION EPIDURAL; INFILTRATION; INTRACAUDAL; PERINEURAL AS NEEDED
Status: DISCONTINUED | OUTPATIENT
Start: 2019-07-29 | End: 2019-07-29 | Stop reason: SURG

## 2019-07-29 RX ORDER — ALBUTEROL SULFATE 90 UG/1
2 AEROSOL, METERED RESPIRATORY (INHALATION) EVERY 6 HOURS PRN
Status: DISCONTINUED | OUTPATIENT
Start: 2019-07-29 | End: 2019-07-30

## 2019-07-29 RX ORDER — LIDOCAINE HYDROCHLORIDE AND EPINEPHRINE 10; 10 MG/ML; UG/ML
INJECTION, SOLUTION INFILTRATION; PERINEURAL AS NEEDED
Status: DISCONTINUED | OUTPATIENT
Start: 2019-07-29 | End: 2019-07-29 | Stop reason: HOSPADM

## 2019-07-29 RX ORDER — ROCURONIUM BROMIDE 10 MG/ML
INJECTION, SOLUTION INTRAVENOUS AS NEEDED
Status: DISCONTINUED | OUTPATIENT
Start: 2019-07-29 | End: 2019-07-29 | Stop reason: SURG

## 2019-07-29 RX ORDER — ONDANSETRON 2 MG/ML
INJECTION INTRAMUSCULAR; INTRAVENOUS AS NEEDED
Status: DISCONTINUED | OUTPATIENT
Start: 2019-07-29 | End: 2019-07-29 | Stop reason: SURG

## 2019-07-29 RX ORDER — MORPHINE SULFATE 10 MG/ML
6 INJECTION, SOLUTION INTRAMUSCULAR; INTRAVENOUS EVERY 10 MIN PRN
Status: DISCONTINUED | OUTPATIENT
Start: 2019-07-29 | End: 2019-07-29 | Stop reason: HOSPADM

## 2019-07-29 RX ORDER — ONDANSETRON 2 MG/ML
4 INJECTION INTRAMUSCULAR; INTRAVENOUS EVERY 4 HOURS PRN
Status: DISCONTINUED | OUTPATIENT
Start: 2019-07-29 | End: 2019-07-30

## 2019-07-29 RX ORDER — EPHEDRINE SULFATE 50 MG/ML
INJECTION, SOLUTION INTRAVENOUS AS NEEDED
Status: DISCONTINUED | OUTPATIENT
Start: 2019-07-29 | End: 2019-07-29 | Stop reason: SURG

## 2019-07-29 RX ORDER — DIPHENHYDRAMINE HYDROCHLORIDE 50 MG/ML
25 INJECTION INTRAMUSCULAR; INTRAVENOUS ONCE AS NEEDED
Status: ACTIVE | OUTPATIENT
Start: 2019-07-29 | End: 2019-07-29

## 2019-07-29 RX ORDER — MORPHINE SULFATE 2 MG/ML
2 INJECTION, SOLUTION INTRAMUSCULAR; INTRAVENOUS EVERY 2 HOUR PRN
Status: DISCONTINUED | OUTPATIENT
Start: 2019-07-29 | End: 2019-07-30

## 2019-07-29 RX ORDER — SODIUM CHLORIDE, SODIUM LACTATE, POTASSIUM CHLORIDE, CALCIUM CHLORIDE 600; 310; 30; 20 MG/100ML; MG/100ML; MG/100ML; MG/100ML
INJECTION, SOLUTION INTRAVENOUS CONTINUOUS
Status: DISCONTINUED | OUTPATIENT
Start: 2019-07-29 | End: 2019-07-29

## 2019-07-29 RX ORDER — ONDANSETRON 2 MG/ML
4 INJECTION INTRAMUSCULAR; INTRAVENOUS ONCE AS NEEDED
Status: DISCONTINUED | OUTPATIENT
Start: 2019-07-29 | End: 2019-07-29 | Stop reason: HOSPADM

## 2019-07-29 RX ORDER — HYDROCODONE BITARTRATE AND ACETAMINOPHEN 5; 325 MG/1; MG/1
2 TABLET ORAL EVERY 4 HOURS PRN
Status: DISCONTINUED | OUTPATIENT
Start: 2019-07-29 | End: 2019-07-30

## 2019-07-29 RX ORDER — PROCHLORPERAZINE EDISYLATE 5 MG/ML
5 INJECTION INTRAMUSCULAR; INTRAVENOUS ONCE AS NEEDED
Status: DISCONTINUED | OUTPATIENT
Start: 2019-07-29 | End: 2019-07-29 | Stop reason: HOSPADM

## 2019-07-29 RX ORDER — HYDROCODONE BITARTRATE AND ACETAMINOPHEN 5; 325 MG/1; MG/1
1 TABLET ORAL AS NEEDED
Status: DISCONTINUED | OUTPATIENT
Start: 2019-07-29 | End: 2019-07-29 | Stop reason: HOSPADM

## 2019-07-29 RX ORDER — HYDROCODONE BITARTRATE AND ACETAMINOPHEN 5; 325 MG/1; MG/1
1 TABLET ORAL EVERY 4 HOURS PRN
Status: DISCONTINUED | OUTPATIENT
Start: 2019-07-29 | End: 2019-07-30

## 2019-07-29 RX ORDER — HYDROMORPHONE HYDROCHLORIDE 1 MG/ML
0.2 INJECTION, SOLUTION INTRAMUSCULAR; INTRAVENOUS; SUBCUTANEOUS EVERY 5 MIN PRN
Status: DISCONTINUED | OUTPATIENT
Start: 2019-07-29 | End: 2019-07-29 | Stop reason: HOSPADM

## 2019-07-29 RX ORDER — POLYETHYLENE GLYCOL 3350 17 G/17G
17 POWDER, FOR SOLUTION ORAL DAILY PRN
Status: DISCONTINUED | OUTPATIENT
Start: 2019-07-29 | End: 2019-07-30

## 2019-07-29 RX ADMIN — GLYCOPYRROLATE 0.2 MG: 0.2 INJECTION INTRAMUSCULAR; INTRAVENOUS at 11:09:00

## 2019-07-29 RX ADMIN — CEFAZOLIN SODIUM/WATER 2 G: 2 G/20 ML SYRINGE (ML) INTRAVENOUS at 10:56:00

## 2019-07-29 RX ADMIN — LIDOCAINE HYDROCHLORIDE 5 ML: 10 INJECTION, SOLUTION EPIDURAL; INFILTRATION; INTRACAUDAL; PERINEURAL at 10:49:00

## 2019-07-29 RX ADMIN — ROPIVACAINE HYDROCHLORIDE 30 ML: 5 INJECTION, SOLUTION EPIDURAL; INFILTRATION; PERINEURAL at 10:30:00

## 2019-07-29 RX ADMIN — EPHEDRINE SULFATE 10 MG: 50 INJECTION, SOLUTION INTRAVENOUS at 11:19:00

## 2019-07-29 RX ADMIN — ONDANSETRON 4 MG: 2 INJECTION INTRAMUSCULAR; INTRAVENOUS at 10:54:00

## 2019-07-29 RX ADMIN — LIDOCAINE HYDROCHLORIDE 3 ML: 10 INJECTION, SOLUTION INFILTRATION; PERINEURAL at 10:30:00

## 2019-07-29 RX ADMIN — GLYCOPYRROLATE 0.2 MG: 0.2 INJECTION INTRAMUSCULAR; INTRAVENOUS at 11:12:00

## 2019-07-29 RX ADMIN — MIDAZOLAM HYDROCHLORIDE 2 MG: 1 INJECTION INTRAMUSCULAR; INTRAVENOUS at 10:30:00

## 2019-07-29 RX ADMIN — DEXAMETHASONE SODIUM PHOSPHATE 8 MG: 4 MG/ML VIAL (ML) INJECTION at 10:54:00

## 2019-07-29 RX ADMIN — NEOSTIGMINE METHYLSULFATE 3 MG: 0.5 INJECTION INTRAVENOUS at 12:48:00

## 2019-07-29 RX ADMIN — ROCURONIUM BROMIDE 50 MG: 10 INJECTION, SOLUTION INTRAVENOUS at 10:49:00

## 2019-07-29 RX ADMIN — GLYCOPYRROLATE 0.6 MG: 0.2 INJECTION INTRAMUSCULAR; INTRAVENOUS at 12:48:00

## 2019-07-29 RX ADMIN — SODIUM CHLORIDE, SODIUM LACTATE, POTASSIUM CHLORIDE, CALCIUM CHLORIDE: 600; 310; 30; 20 INJECTION, SOLUTION INTRAVENOUS at 12:59:00

## 2019-07-29 NOTE — DISCHARGE SUMMARY
[facility] Hospitalist Discharge Summary   Patient ID:  Shaggy Herrera  [id number]  89 year old  12/9/1955    Admit date: 7/29/2019  Discharge date: 7/30/2019    Primary Care Physician: Sierra Lea MD   Attending Physician: Karyle Linear, MD   Consults: RESP: non labored, CTA  CARDIO: Regular, no murmur  ABD: soft, NT, ND, BS+  EXTREMITIES: left shoulder with dressing and sling    Operative Procedures: Procedure(s) (LRB):  SHOULDER TOTAL (Left)  Radiology:   Xr Shoulder, (1 View), Left (cpt=73020)    Resu Bring a paper prescription for each of these medications  · HYDROcodone-acetaminophen  MG Tabs  · HYDROcodone-acetaminophen 5-325 MG Tabs       Important follow up: Follow-up Information     Renan Lozano MD In 2 weeks.     Specialty:  Adarsh Madrid [facility] Team  Pager [phone]  Answering Service number: [phone]  7/30/2019      SEE ATTENDING NOTE BELOW        Patient seen and examined independently. Discussed with APN and agree with note above    Patient improved.   Stable

## 2019-07-29 NOTE — ANESTHESIA PROCEDURE NOTES
Peripheral Block  Date/Time: 7/29/2019 10:30 AM    Anesthesiologist:  Francine Mitchell MD  Performed by:   Anesthesiologist  Patient Location:  PACU  Start Time:  7/29/2019 10:28 AM  End Time:  7/29/2019 10:35 AM  Site Identification: ultrasound guided, scotty

## 2019-07-29 NOTE — H&P
[facility] Hospitalist Team  History and Physical  Admit Date:  7/29/19    ASSESSMENT / PLAN:   61 y male with hx BPH, bronchitis-resolved, insomnia/depression, post concussive syndrome-resolved, Mèniére disease, HTN, obesity-BMI 29 and OA who is S/P Left Total S sclera anicteric, conjunctiva normal  NECK: supple, no JVD, no carotid bruits   RESPIRATORY: normal expansion; non labored, CTA   CARDIOVASCULAR: regular,nl S1 S2; no murmur, no gallop,  no rub   ABDOMEN:  Soft, BS+; non distended, non tender    EXTREMITIE daily. Disp: 90 tablet Rfl: 3   Albuterol Sulfate  (90 Base) MCG/ACT Inhalation Aero Soln Inhale 2 puffs into the lungs every 6 (six) hours as needed for Wheezing.  Disp: 3 Inhaler Rfl: 3   Meclizine HCl (ANTIVERT) 25 MG Oral Tab Take 1 tablet (25 mg Temp 97.5 °F (36.4 °C) (Temporal)   Resp 20   Ht 175.3 cm (5' 9\")   Wt 233 lb (105.7 kg)   SpO2 97%   BMI 34.41 kg/m²      Gen: No acute distress, alert and oriented x3  Neck Supple, no JVD  Pulm: Lungs clear, normal respiratory effort, No wheezing or cr

## 2019-07-29 NOTE — ANESTHESIA PROCEDURE NOTES
Airway  Urgency: elective      General Information and Staff    Patient location during procedure: OR  Anesthesiologist: Brad Robledo MD  Resident/CRNA: Arelis Nance CRNA  Performed: CRNA     Indications and Patient Condition  Indications for airway

## 2019-07-29 NOTE — OPERATIVE REPORT
Operative Note    Patient: Lissette Whitaker  MRN: [id number]       YOB: 1955   Age: 61year old   Sex: male     Date of Procedure: 7/29/2019   Preoperative Diagnosis: Glenohumeral arthritis, left shoulder  Postoperative Diagnosis: same   Proce competent. Next, the humeral head was identified and the capsule elevated circumferentially off the humerus, Allowing complete external rotation and dislocation of the humerus.       the humeral head was inspected and noted to be significantly arthritic fixation. The joint was then reduced, copiously irrigated once more, trialed again with significant motion and posterior translation of 50% and brisk bounce back.      The subscapularis was then repaired while in internal rotation back to previously placed

## 2019-07-29 NOTE — ANESTHESIA PREPROCEDURE EVALUATION
Anesthesia PreOp Note    HPI:     Nathaniel Crespo is a 61year old male who presents for preoperative consultation requested by: Prince Beltran MD    Date of Surgery: 7/29/2019    Procedure(s):  SHOULDER TOTAL  Indication: Glenohumeral arthritis, left [Q85.380 HISTORY      sinus surgery   • TOTAL KNEE REPLACEMENT           Medications Prior to Admission:  Triamterene-HCTZ (DYAZIDE) 37.5-25 MG Oral Cap Take 1 capsule by mouth every morning.  Disp: 90 capsule Rfl: 5 7/24/2019   Amitriptyline HCl 25 MG Oral Tab Take file    Social Needs      Financial resource strain: Not on file      Food insecurity:        Worry: Not on file        Inability: Not on file      Transportation needs:        Medical: Not on file        Non-medical: Not on file    Tobacco Use      Sohailin Resp:  16   Temp:  98 °F (36.7 °C)   TempSrc:  Oral   SpO2:  96%   Weight: 104.3 kg (230 lb) 105.7 kg (233 lb)   Height: 1.753 m (5' 9\")         Anesthesia Evaluation     Patient summary reviewed and Nursing notes reviewed    Airway   Mallampati: II  TM

## 2019-07-29 NOTE — ANESTHESIA POSTPROCEDURE EVALUATION
Patient: Aurelia Madrid    Procedure Summary     Date:  07/29/19 Room / Location:  [address] [place] [address] MAIN OR 08 / [address] [facility] OR    Anesthesia Start:  2982 Anesthesia Stop:  3756    Procedure:  SHOULDER TOTAL (Left Shoulder) Diagnosis:       Glenohumeral arthritis, left

## 2019-07-29 NOTE — PLAN OF CARE
Problem: SKIN/TISSUE INTEGRITY - ADULT  Goal: Incision(s), wounds(s) or drain site(s) healing without S/S of infection  Description  INTERVENTIONS:  - Assess and document risk factors for pressure ulcer development  - Assess and document skin integrity maximize function  - Promote sitting position while performing ADLs such as feeding, grooming, and bathing  - Educate and encourage patient/family in tolerated functional activity level and precautions during self-care    Outcome: Progressing     Problem: appropriate  - Identify discharge learning needs (meds, wound care, etc)  - Arrange for interpreters to assist at discharge as needed  - Consider post-discharge preferences of patient/family/discharge partner  - Complete POLST form as appropriate  - Assess

## 2019-07-29 NOTE — H&P
Update, no change, plan for left TSA    2019   CHIEF COMPLAINT   Patient presents with:  Shoulder Pain: NKI. Bilateral shoulder pain. pain and limited rom. pain while sleeping at night. xray today.  REF BY Dr. Lorena Del Toro 1995        Years since quittin.1      Smokeless tobacco: Never Used    Substance and Sexual Activity      Alcohol use: Yes        Comment: COUPLE TIMES PER YEAR        Family History:        Family History   Problem Relation Age of Onset   • Diab forearms and hands.       Motor Exam:  All musculoskeletal units were functioning independently at a 5/5 strength.     Joint Stability:  No obvious dislocation, subluxation or significant laxity in either upper extremity.      Other Provocative Tests and A

## 2019-07-30 ENCOUNTER — APPOINTMENT (OUTPATIENT)
Dept: GENERAL RADIOLOGY | Facility: HOSPITAL | Age: 64
DRG: 483 | End: 2019-07-30
Attending: ORTHOPAEDIC SURGERY
Payer: COMMERCIAL

## 2019-07-30 VITALS
HEIGHT: 69 IN | SYSTOLIC BLOOD PRESSURE: 119 MMHG | TEMPERATURE: 98 F | DIASTOLIC BLOOD PRESSURE: 70 MMHG | HEART RATE: 87 BPM | BODY MASS INDEX: 34.51 KG/M2 | RESPIRATION RATE: 18 BRPM | OXYGEN SATURATION: 94 % | WEIGHT: 233 LBS

## 2019-07-30 PROBLEM — G47.09 OTHER INSOMNIA: Status: ACTIVE | Noted: 2019-07-30

## 2019-07-30 LAB
DEPRECATED RDW RBC AUTO: 43.3 FL (ref 35.1–46.3)
ERYTHROCYTE [DISTWIDTH] IN BLOOD BY AUTOMATED COUNT: 14.4 % (ref 11–15)
HCT VFR BLD AUTO: 39.1 % (ref 39–53)
HGB BLD-MCNC: 12.8 G/DL (ref 13–17.5)
MCH RBC QN AUTO: 27.2 PG (ref 26–34)
MCHC RBC AUTO-ENTMCNC: 32.7 G/DL (ref 31–37)
MCV RBC AUTO: 83.2 FL (ref 80–100)
PLATELET # BLD AUTO: 245 10(3)UL (ref 150–450)
RBC # BLD AUTO: 4.7 X10(6)UL (ref 4.3–5.7)
WBC # BLD AUTO: 19.3 X10(3) UL (ref 4–11)

## 2019-07-30 PROCEDURE — 97166 OT EVAL MOD COMPLEX 45 MIN: CPT

## 2019-07-30 PROCEDURE — 97535 SELF CARE MNGMENT TRAINING: CPT

## 2019-07-30 PROCEDURE — 85027 COMPLETE CBC AUTOMATED: CPT | Performed by: ORTHOPAEDIC SURGERY

## 2019-07-30 PROCEDURE — 97530 THERAPEUTIC ACTIVITIES: CPT

## 2019-07-30 PROCEDURE — 73020 X-RAY EXAM OF SHOULDER: CPT | Performed by: ORTHOPAEDIC SURGERY

## 2019-07-30 RX ORDER — HYDROCODONE BITARTRATE AND ACETAMINOPHEN 10; 325 MG/1; MG/1
1 TABLET ORAL EVERY 4 HOURS PRN
Status: DISCONTINUED | OUTPATIENT
Start: 2019-07-30 | End: 2019-07-30

## 2019-07-30 RX ORDER — HYDROCODONE BITARTRATE AND ACETAMINOPHEN 10; 325 MG/1; MG/1
TABLET ORAL
Qty: 30 TABLET | Refills: 0 | Status: SHIPPED | OUTPATIENT
Start: 2019-07-30 | End: 2020-06-24

## 2019-07-30 RX ORDER — HYDROCODONE BITARTRATE AND ACETAMINOPHEN 10; 325 MG/1; MG/1
2 TABLET ORAL EVERY 6 HOURS PRN
Status: DISCONTINUED | OUTPATIENT
Start: 2019-07-30 | End: 2019-07-30

## 2019-07-30 NOTE — ANESTHESIA POSTPROCEDURE EVALUATION
Patient: Neva Herrera    Procedure Summary     Date:  07/29/19 Room / Location:  [address] [place] [address] MAIN OR 08 / [address] [place] [address] OR    Anesthesia Start:  6716 Anesthesia Stop:  4159    Procedure:  SHOULDER TOTAL (Left Shoulder) Diagnosis:       Glenohumeral arthritis, left

## 2019-07-30 NOTE — PLAN OF CARE
Rachna Contreras is now POD#1 after having a reverse TSA. He is up independently with a steady gait, NWB to the LUE. Voiding freely postop. Pain is being managed with norco prn. Bed is locked and in the lowest position with side rails up x2.  Nonskid socks and fall risk and request assistance  - Assess pain using appropriate pain scale  - Administer analgesics based on type and severity of pain and evaluate response  - Implement non-pharmacological measures as appropriate and evaluate response  - Consider cultural and soc post-hospital services based on physician/LIP order or complex needs related to functional status, cognitive ability or social support system  Outcome: Progressing

## 2019-07-30 NOTE — OCCUPATIONAL THERAPY NOTE
OCCUPATIONAL THERAPY EVALUATION - INPATIENT      Room Number: 415/415-A  Evaluation Date: 7/30/2019  Type of Evaluation: Initial and Quick Eval  Presenting Problem: reverse TSA    Physician Order: IP Consult to Occupational Therapy  Reason for Therapy: ADL presents with no skilled Occupational Therapy needs  at this time. Patient will be discharged from Occupational Therapy services. Please re-order if a new functional limitation presents during this admission.     OCCUPATIONAL THERAPY MEDICAL/SOCIAL HISTORY OBJECTIVE  Precautions: (LUE immobilizer)  Fall Risk: Standard fall risk    WEIGHT BEARING RESTRICTION  Weight Bearing Restriction: L upper extremity     L Upper Extremity: Non-Weight Bearing          PAIN ASSESSMENT  Ratin  Location: L shoulder  M Goals  Patient self-stated goal is: home

## 2019-07-30 NOTE — PLAN OF CARE
Problem: Patient/Family Goals  Goal: Patient/Family Long Term Goal  Description  Patient's Long Term Goal:     Interventions:  -   - See additional Care Plan goals for specific interventions  Outcome: Adequate for Discharge  Goal: Patient/Family Short Te assistive device and precautions (e.g. spinal or hip dislocation precautions)  7/30/2019 1453 by Wilfredo Keen, RN  Outcome: Adequate for Discharge  7/30/2019 0919 by Wilfredo Keen, RN  Outcome: Progressing     Problem: Impaired Functional Mobility pre-medicate as appropriate  7/30/2019 1453 by Lionel Garcia RN  Outcome: Adequate for Discharge  7/30/2019 0919 by Lionel Garcia RN  Outcome: Progressing     Problem: SAFETY ADULT - FALL  Goal: Free from fall injury  Description  INTERVENTIONS:  - care. Pt and his wife educated on safety, medications, and incisional care. All questions answered.

## 2019-07-30 NOTE — PROGRESS NOTES
X-ray reviewed, patient okay for home once stable    No home health recommended  Follow-up was arranged

## 2019-08-19 PROBLEM — Z96.612 S/P REVERSE TOTAL SHOULDER ARTHROPLASTY, LEFT: Status: ACTIVE | Noted: 2019-08-19

## 2019-10-24 PROBLEM — M65.311 TRIGGER FINGER OF RIGHT THUMB: Status: ACTIVE | Noted: 2019-10-24

## 2019-10-27 ENCOUNTER — HOSPITAL ENCOUNTER (OUTPATIENT)
Age: 64
Discharge: HOME | End: 2019-10-27
Attending: EMERGENCY MEDICINE
Payer: COMMERCIAL

## 2019-10-27 VITALS
OXYGEN SATURATION: 98 % | TEMPERATURE: 98 F | SYSTOLIC BLOOD PRESSURE: 137 MMHG | RESPIRATION RATE: 18 BRPM | HEIGHT: 69 IN | DIASTOLIC BLOOD PRESSURE: 71 MMHG | HEART RATE: 81 BPM | BODY MASS INDEX: 34.8 KG/M2 | WEIGHT: 235 LBS

## 2019-10-27 DIAGNOSIS — J06.9 VIRAL URI WITH COUGH: Primary | ICD-10-CM

## 2019-10-27 PROCEDURE — 99213 OFFICE O/P EST LOW 20 MIN: CPT

## 2019-10-27 PROCEDURE — 99212 OFFICE O/P EST SF 10 MIN: CPT

## 2019-10-27 NOTE — ED PROVIDER NOTES
Patient Seen in: [facility] Immediate Care In [address] [place] [address]      History   Patient presents with:  Cough/URI    Stated Complaint: cough, congestion    HPI    Patient is a 59-year-old male with a history of asthma who presents to immediate care with cou Quit date: 1995        Years since quittin.8      Smokeless tobacco: Never Used    Alcohol use: Yes      Frequency: Monthly or less    Drug use: Never             Review of Systems   Constitutional: Negative. Negative for fever.    HENT: Positive murmur. Pulmonary:      Effort: Pulmonary effort is normal.      Breath sounds: Normal breath sounds. No stridor. No wheezing, rhonchi or rales. Abdominal:      General: Abdomen is flat.  Bowel sounds are normal.   Lymphadenopathy:      Cervical: No cer

## 2019-11-21 ENCOUNTER — APPOINTMENT (OUTPATIENT)
Dept: OCCUPATIONAL MEDICINE | Age: 64
End: 2019-11-21
Attending: PHYSICIAN ASSISTANT

## 2019-11-25 ENCOUNTER — APPOINTMENT (OUTPATIENT)
Dept: OCCUPATIONAL MEDICINE | Age: 64
End: 2019-11-25
Attending: FAMILY MEDICINE

## 2019-12-02 ENCOUNTER — APPOINTMENT (OUTPATIENT)
Dept: OCCUPATIONAL MEDICINE | Age: 64
End: 2019-12-02
Attending: FAMILY MEDICINE

## 2019-12-09 ENCOUNTER — HOSPITAL ENCOUNTER (OUTPATIENT)
Dept: GENERAL RADIOLOGY | Age: 64
Discharge: HOME | End: 2019-12-09
Attending: FAMILY MEDICINE

## 2019-12-09 ENCOUNTER — OFFICE VISIT (OUTPATIENT)
Dept: OCCUPATIONAL MEDICINE | Age: 64
End: 2019-12-09
Attending: FAMILY MEDICINE

## 2019-12-09 ENCOUNTER — ORDER TRANSCRIPTION (OUTPATIENT)
Dept: PHYSICAL THERAPY | Age: 64
End: 2019-12-09

## 2019-12-09 DIAGNOSIS — S09.90XA INJURY OF HEAD, INITIAL ENCOUNTER: ICD-10-CM

## 2019-12-09 DIAGNOSIS — S00.33XA CONTUSION OF NOSE, INITIAL ENCOUNTER: ICD-10-CM

## 2019-12-09 DIAGNOSIS — S16.1XXA CERVICAL STRAIN: Primary | ICD-10-CM

## 2019-12-09 DIAGNOSIS — S01.81XA FACIAL LACERATION, INITIAL ENCOUNTER: ICD-10-CM

## 2019-12-09 DIAGNOSIS — S16.1XXA STRAIN OF NECK MUSCLE, INITIAL ENCOUNTER: ICD-10-CM
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
## 2019-12-09 PROCEDURE — 70160 X-RAY EXAM OF NASAL BONES: CPT | Performed by: FAMILY MEDICINE

## 2019-12-09 PROCEDURE — 72050 X-RAY EXAM NECK SPINE 4/5VWS: CPT | Performed by: FAMILY MEDICINE

## 2019-12-11 ENCOUNTER — APPOINTMENT (OUTPATIENT)
Dept: OCCUPATIONAL MEDICINE | Age: 64
End: 2019-12-11
Attending: FAMILY MEDICINE

## 2019-12-17 ENCOUNTER — OFFICE VISIT (OUTPATIENT)
Dept: PHYSICAL THERAPY | Age: 64
End: 2019-12-17
Attending: FAMILY MEDICINE

## 2019-12-17 ENCOUNTER — APPOINTMENT (OUTPATIENT)
Dept: OCCUPATIONAL MEDICINE | Age: 64
End: 2019-12-17
Attending: FAMILY MEDICINE

## 2019-12-17 DIAGNOSIS — S16.1XXA CERVICAL STRAIN: ICD-10-CM

## 2019-12-17 NOTE — PROGRESS NOTES
P.TKina EVALUATION:   Referring Physician: Dr. Colonel Maddox  Diagnosis:  Cervical strain (Z54.6XCZ)   Date of Onset: December 9, 2019 Date of Service: 12/17/2019     PATIENT SUMMARY   Jacob Campuzano is a 59year old y/o male who presents to therapy today with co pain)  Rot: R min loss (NE), L min loss (L neck pain)  Lat flx: R mod loss (NE), L mod loss (L neck pain)    Shoulder ROM:  Flx: B WNL  Abd: B WNL   ER/HBH: B WNL  IR/HBB: B PSIS    Strength/MMT:   Shoulder flx: R 5/5, L 5/5  Shoulder abd: Not tested  Lana Horvath

## 2019-12-30 ENCOUNTER — APPOINTMENT (OUTPATIENT)
Dept: PHYSICAL THERAPY | Age: 64
End: 2019-12-30
Attending: FAMILY MEDICINE

## 2020-01-06 ENCOUNTER — APPOINTMENT (OUTPATIENT)
Dept: PHYSICAL THERAPY | Age: 65
End: 2020-01-06
Attending: FAMILY MEDICINE

## 2020-01-08 ENCOUNTER — APPOINTMENT (OUTPATIENT)
Dept: PHYSICAL THERAPY | Age: 65
End: 2020-01-08
Attending: FAMILY MEDICINE

## 2020-04-08 ENCOUNTER — HOSPITAL ENCOUNTER (EMERGENCY)
Facility: HOSPITAL | Age: 65
Discharge: HOME | End: 2020-04-08
Attending: EMERGENCY MEDICINE
Payer: COMMERCIAL

## 2020-04-08 ENCOUNTER — APPOINTMENT (OUTPATIENT)
Dept: GENERAL RADIOLOGY | Facility: HOSPITAL | Age: 65
End: 2020-04-08
Attending: EMERGENCY MEDICINE
Payer: COMMERCIAL

## 2020-04-08 VITALS
SYSTOLIC BLOOD PRESSURE: 115 MMHG | HEIGHT: 69 IN | OXYGEN SATURATION: 96 % | WEIGHT: 230 LBS | HEART RATE: 75 BPM | TEMPERATURE: 98 F | DIASTOLIC BLOOD PRESSURE: 67 MMHG | RESPIRATION RATE: 17 BRPM | BODY MASS INDEX: 34.07 KG/M2

## 2020-04-08 DIAGNOSIS — J06.9 UPPER RESPIRATORY TRACT INFECTION, UNSPECIFIED TYPE: Primary | ICD-10-CM

## 2020-04-08 PROCEDURE — 93005 ELECTROCARDIOGRAM TRACING: CPT

## 2020-04-08 PROCEDURE — 85025 COMPLETE CBC W/AUTO DIFF WBC: CPT | Performed by: EMERGENCY MEDICINE

## 2020-04-08 PROCEDURE — 36415 COLL VENOUS BLD VENIPUNCTURE: CPT

## 2020-04-08 PROCEDURE — 84484 ASSAY OF TROPONIN QUANT: CPT | Performed by: EMERGENCY MEDICINE

## 2020-04-08 PROCEDURE — 99284 EMERGENCY DEPT VISIT MOD MDM: CPT

## 2020-04-08 PROCEDURE — 93010 ELECTROCARDIOGRAM REPORT: CPT | Performed by: EMERGENCY MEDICINE

## 2020-04-08 PROCEDURE — 71045 X-RAY EXAM CHEST 1 VIEW: CPT | Performed by: EMERGENCY MEDICINE

## 2020-04-08 PROCEDURE — 80048 BASIC METABOLIC PNL TOTAL CA: CPT | Performed by: EMERGENCY MEDICINE

## 2020-04-08 NOTE — ED INITIAL ASSESSMENT (HPI)
Pt instructed to come to ED by PCP with c/o dyspnea, fever, non productive cough, chest pain on left rib,  and wheezing for 10 days. Pt with hx of asthma.    Pt states pt co-worker possible exposure with COVID-19

## 2020-04-08 NOTE — ED PROVIDER NOTES
Patient Seen in: [facility] AND [facility] Emergency Department      History   Patient presents with:  Fever    Stated Complaint: fever, chest tightness, wheezing, crackles, HA, body aches    HPI    77-year-old male with history of coronary artery disease, bron SHOULDER IMPLANT Left 07/29/2019    Dr Evelyn Brown   • SHOULDER TOTAL Left 7/29/2019    Performed by Rosey Peacock MD at [facility] MAIN OR                    Social History    Tobacco Use      Smoking status: Former Smoker        Packs/day: 3.50        Years: 28.00 the following components:    RBC 5.72 (*)     All other components within normal limits   TROPONIN I - Normal   CBC WITH DIFFERENTIAL WITH PLATELET    Narrative: The following orders were created for panel order CBC WITH DIFFERENTIAL WITH PLATELET.   Pr
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
## 2020-11-06 PROBLEM — F33.2 MDD (MAJOR DEPRESSIVE DISORDER), RECURRENT EPISODE, SEVERE (HCC): Status: ACTIVE | Noted: 2020-11-06

## 2020-11-06 NOTE — ED NOTES
Patient requesting to call his wife. Portable phone provided to patient at this time. Breakfast ordered, all needs met.

## 2020-11-06 NOTE — ED NOTES
Took over 1:1 care of patient. Patient resting on ER cart drinking coffee and watching TV. Room is safe. Remains calm and cooperative. Has no questions or concerns at this time.

## 2020-11-06 NOTE — ED NOTES
Nurse to nurse report given to Faith Leiva at [facility] REHABILITATION [facility].

## 2020-11-06 NOTE — ED PROVIDER NOTES
Patient Seen in: [facility] AND [facility] Emergency Department    History   Patient presents with:  Eval-P    Stated Complaint: p-eval     HPI    Patient complains of major depressio nwith suicidal ideation, escalating over past couple months.   Seeing therapist lungs every 6 (six) hours as needed for Wheezing. Meclizine HCl (ANTIVERT) 25 MG Oral Tab,  Take 1 tablet (25 mg total) by mouth 3 (three) times daily as needed.    ASPIRIN 81 MG OR TABS,  1 TABLET DAILY       Family History   Problem Relation Age of Onse BASIC METABOLIC PANEL (8) - Abnormal; Notable for the following components:       Result Value    Glucose 102 (*)     BUN/CREA Ratio 20.9 (*)     All other components within normal limits   ACETAMINOPHEN (TYLENOL), S - Abnormal; Notable for the following diagnosis)    Disposition:  Psychiatric transfer    Follow-up:  No follow-up provider specified.     Medications Prescribed:  Current Discharge Medication List

## 2020-11-06 NOTE — ED NOTES
Assumed care of patient at [address] [facility] from [facility]. Patient to go to [facility] around 0800. Patient resting comfortably at this time, breathing even and non labored, all needs met.

## 2020-11-06 NOTE — ED NOTES
Report received from [facility]. Patient sitting on cart watching TV. Breakfast tray received and patient updated with plan of care. Patient remains calm and cooperative.

## 2020-11-06 NOTE — ED NOTES
[facility] arrived to transport patient to [facility]. All paperwork provided and belongings given to [place] by public safety. Patient left ED in stable condition.

## 2020-11-06 NOTE — ED NOTES
Patient updated on plan of care- transfer at 0800. Patient agreeable and calm at this time. Coffee provided.

## 2020-11-06 NOTE — ED PROVIDER NOTES
Patient endorsed to me by Dr. Rick Johnson for continuation of care. Patient is awaiting inpatient psychiatric transfer for suicidal ideation. Patient has been calm throughout my shift. Patient endorsed to Dr. Randa Kang for continuation of care.

## 2020-11-06 NOTE — ED NOTES
Pt accepted at [facility] by Dr. Irina Tineo.  Pt can be transferred at 8am. Nurse to nurse will be completed in AM.

## 2020-11-12 ENCOUNTER — APPOINTMENT (OUTPATIENT)
Dept: CT IMAGING | Facility: HOSPITAL | Age: 65
End: 2020-11-12
Attending: EMERGENCY MEDICINE
Payer: COMMERCIAL

## 2020-11-12 ENCOUNTER — HOSPITAL ENCOUNTER (EMERGENCY)
Facility: HOSPITAL | Age: 65
Discharge: NURSING, REHAB OR LONG-TERM CARE | End: 2020-11-12
Attending: EMERGENCY MEDICINE
Payer: COMMERCIAL

## 2020-11-12 VITALS
RESPIRATION RATE: 16 BRPM | OXYGEN SATURATION: 99 % | SYSTOLIC BLOOD PRESSURE: 113 MMHG | WEIGHT: 190.06 LBS | DIASTOLIC BLOOD PRESSURE: 67 MMHG | TEMPERATURE: 98 F | HEART RATE: 60 BPM | BODY MASS INDEX: 28 KG/M2

## 2020-11-12 DIAGNOSIS — R55 MICTURITION SYNCOPE: Primary | ICD-10-CM

## 2020-11-12 PROBLEM — F33.2 SEVERE RECURRENT MAJOR DEPRESSION (HCC): Status: ACTIVE | Noted: 2020-11-12

## 2020-11-12 PROCEDURE — 99285 EMERGENCY DEPT VISIT HI MDM: CPT

## 2020-11-12 PROCEDURE — 85025 COMPLETE CBC W/AUTO DIFF WBC: CPT | Performed by: EMERGENCY MEDICINE

## 2020-11-12 PROCEDURE — 93005 ELECTROCARDIOGRAM TRACING: CPT

## 2020-11-12 PROCEDURE — 84484 ASSAY OF TROPONIN QUANT: CPT | Performed by: EMERGENCY MEDICINE

## 2020-11-12 PROCEDURE — 80053 COMPREHEN METABOLIC PANEL: CPT | Performed by: EMERGENCY MEDICINE

## 2020-11-12 PROCEDURE — 70450 CT HEAD/BRAIN W/O DYE: CPT | Performed by: EMERGENCY MEDICINE

## 2020-11-12 PROCEDURE — 93010 ELECTROCARDIOGRAM REPORT: CPT

## 2020-11-12 PROCEDURE — 99284 EMERGENCY DEPT VISIT MOD MDM: CPT

## 2020-11-12 PROCEDURE — 36415 COLL VENOUS BLD VENIPUNCTURE: CPT

## 2020-11-12 RX ORDER — QUETIAPINE 400 MG/1
400 TABLET, FILM COATED, EXTENDED RELEASE ORAL NIGHTLY
Status: ON HOLD | COMMUNITY
End: 2020-11-13

## 2020-11-12 NOTE — ED INITIAL ASSESSMENT (HPI)
Pt transferred from [facility] s/p unwitnessed fall . Staff was making rounds at [phone] heard loud noise from patient roon, found patient supine on floor. Patient does not remember the fall. Pain to left elbow and top of head.  Seroquel dose increased from 300-400mg

## 2020-11-12 NOTE — ED NOTES
Sherrill Holstein from [facility] notified of patient's return. [facility] called for return trip. Pt ambulatory to bathroom.

## 2020-11-12 NOTE — ED PROVIDER NOTES
Patient Seen in: [facility] Emergency Department      History   Patient presents with:  Fall    Stated Complaint:     JACINTO Carrera is a pleasant 60-year-old male presenting to the emergency department for syncope.   Patient states he was urinating a tobacco: Never Used    Alcohol use:  Yes      Alcohol/week: 1.0 standard drinks      Types: 1 Glasses of wine per week      Frequency: Monthly or less      Drinks per session: 1 or 2      Binge frequency: Never    Drug use: Yes      Frequency: 7.0 times per limits   TROPONIN I - Normal   CBC WITH DIFFERENTIAL WITH PLATELET    Narrative: The following orders were created for panel order CBC WITH DIFFERENTIAL WITH PLATELET.   Procedure                               Abnormality         Status

## 2020-12-07 PROBLEM — M65.332 TRIGGER FINGER, LEFT MIDDLE FINGER: Status: ACTIVE | Noted: 2020-12-07

## 2020-12-07 PROBLEM — M75.81 RIGHT ROTATOR CUFF TENDONITIS: Status: ACTIVE | Noted: 2020-12-07

## 2020-12-27 ENCOUNTER — HOSPITAL ENCOUNTER (OUTPATIENT)
Age: 65
Discharge: HOME | End: 2020-12-27
Attending: FAMILY MEDICINE
Payer: COMMERCIAL

## 2020-12-27 VITALS
HEIGHT: 69 IN | BODY MASS INDEX: 28.73 KG/M2 | SYSTOLIC BLOOD PRESSURE: 118 MMHG | RESPIRATION RATE: 16 BRPM | HEART RATE: 77 BPM | WEIGHT: 194 LBS | DIASTOLIC BLOOD PRESSURE: 62 MMHG | TEMPERATURE: 99 F | OXYGEN SATURATION: 97 %

## 2020-12-27 DIAGNOSIS — S61.213A LACERATION OF LEFT MIDDLE FINGER WITHOUT FOREIGN BODY WITHOUT DAMAGE TO NAIL, INITIAL ENCOUNTER: Primary | ICD-10-CM

## 2020-12-27 PROCEDURE — 99213 OFFICE O/P EST LOW 20 MIN: CPT | Performed by: FAMILY MEDICINE

## 2020-12-27 NOTE — ED PROVIDER NOTES
Patient Seen in: [facility]      History   Patient presents with:  Laceration/Abrasion    Stated Complaint: laceration    HPI    Pt is a 71 yo who presents with a finger laceration to the left 3rd digit. He was cutting some meat PTA.  Last tet use: Yes      Frequency: 7.0 times per week      Types: Cannabis      Comment: Last smoke on Nov 5, 2020. \"smoke a pipe every night to help me sleep\".               Review of Systems    Positive for stated complaint: laceration  Other systems are as note of 12/27/2020  1:38 PM

## 2020-12-28 ENCOUNTER — HOSPITAL ENCOUNTER (OUTPATIENT)
Age: 65
Discharge: HOME | End: 2020-12-28
Attending: EMERGENCY MEDICINE
Payer: COMMERCIAL

## 2020-12-28 VITALS
TEMPERATURE: 98 F | OXYGEN SATURATION: 98 % | RESPIRATION RATE: 18 BRPM | HEART RATE: 75 BPM | DIASTOLIC BLOOD PRESSURE: 70 MMHG | SYSTOLIC BLOOD PRESSURE: 129 MMHG

## 2020-12-28 DIAGNOSIS — S61.213D LACERATION OF LEFT MIDDLE FINGER WITHOUT FOREIGN BODY WITHOUT DAMAGE TO NAIL, SUBSEQUENT ENCOUNTER: Primary | ICD-10-CM

## 2020-12-28 PROCEDURE — 99024 POSTOP FOLLOW-UP VISIT: CPT | Performed by: EMERGENCY MEDICINE

## 2020-12-28 NOTE — ED INITIAL ASSESSMENT (HPI)
Left 3rd finger wound recheck. Pt was here yesterday with a cut to the left 3rd finger. The area was glued, but pt states now the glue popped open.

## 2020-12-28 NOTE — ED PROVIDER NOTES
Patient Seen in: [facility]    History   Patient presents with:  Wound Recheck    Stated Complaint: Cut on finger, left hand     HPI    HPI: Isacc Ray is a 72year old male who presents after an injury to left middle finger that occurred 1 capsule by mouth daily. QUEtiapine Fumarate 300 MG Oral Tab,  Take 1 tablet (300 mg total) by mouth nightly. Albuterol Sulfate  (90 Base) MCG/ACT Inhalation Aero Soln,  Inhale 2 puffs into the lungs every 6 (six) hours as needed for Wheezing. and cooperative. No focal deficit  HEAD: Atraumatic  NECK: Supple, full range of motion without pain or paresthesias  EXTREMITIES: Left middle fingertip laceration noted with Dermabond,  Surgicel applied, no more bleeding  . 2+ distal pulses.   Karen Mayo

## 2021-07-20 PROBLEM — Z87.11 PERSONAL HISTORY OF PEPTIC ULCER DISEASE: Status: ACTIVE | Noted: 2021-07-20

## 2021-07-31 ENCOUNTER — LAB ENCOUNTER (OUTPATIENT)
Dept: LAB | Facility: HOSPITAL | Age: 66
End: 2021-07-31
Payer: MEDICARE

## 2021-07-31 DIAGNOSIS — E66.9 OBESITY: Primary | ICD-10-CM

## 2021-07-31 DIAGNOSIS — E66.9 OBESITY (BMI 30-39.9): ICD-10-CM

## 2021-07-31 LAB
CRP SERPL HS-MCNC: 14.8 MG/L (ref ?–3)
EST. AVERAGE GLUCOSE BLD GHB EST-MCNC: 120 MG/DL (ref 68–126)
HBA1C MFR BLD HPLC: 5.8 % (ref ?–5.7)
INSULIN SERPL-ACNC: 13.8 MU/L (ref 3–25)
VIT B12 SERPL-MCNC: 496 PG/ML (ref 193–986)

## 2021-07-31 PROCEDURE — 36415 COLL VENOUS BLD VENIPUNCTURE: CPT

## 2021-07-31 PROCEDURE — 82607 VITAMIN B-12: CPT

## 2021-07-31 PROCEDURE — 82397 CHEMILUMINESCENT ASSAY: CPT

## 2021-07-31 PROCEDURE — 82306 VITAMIN D 25 HYDROXY: CPT

## 2021-07-31 PROCEDURE — 83525 ASSAY OF INSULIN: CPT

## 2021-07-31 PROCEDURE — 86141 C-REACTIVE PROTEIN HS: CPT

## 2021-07-31 PROCEDURE — 83036 HEMOGLOBIN GLYCOSYLATED A1C: CPT

## 2021-08-02 LAB — 25(OH)D3 SERPL-MCNC: 38.7 NG/ML (ref 30–100)

## 2021-08-05 LAB — LEPTIN: 16.3 NG/ML

## 2021-08-14 ENCOUNTER — APPOINTMENT (OUTPATIENT)
Dept: GENERAL RADIOLOGY | Facility: HOSPITAL | Age: 66
DRG: 175 | End: 2021-08-14
Attending: EMERGENCY MEDICINE
Payer: MEDICARE

## 2021-08-14 ENCOUNTER — APPOINTMENT (OUTPATIENT)
Dept: CT IMAGING | Facility: HOSPITAL | Age: 66
DRG: 175 | End: 2021-08-14
Attending: EMERGENCY MEDICINE
Payer: MEDICARE

## 2021-08-14 ENCOUNTER — APPOINTMENT (OUTPATIENT)
Dept: ULTRASOUND IMAGING | Facility: HOSPITAL | Age: 66
DRG: 175 | End: 2021-08-14
Attending: HOSPITALIST
Payer: MEDICARE

## 2021-08-14 ENCOUNTER — HOSPITAL ENCOUNTER (INPATIENT)
Facility: HOSPITAL | Age: 66
LOS: 5 days | Discharge: HOME | DRG: 175 | End: 2021-08-19
Attending: EMERGENCY MEDICINE | Admitting: INTERNAL MEDICINE
Payer: MEDICARE

## 2021-08-14 ENCOUNTER — APPOINTMENT (OUTPATIENT)
Dept: CV DIAGNOSTICS | Facility: HOSPITAL | Age: 66
DRG: 175 | End: 2021-08-14
Attending: EMERGENCY MEDICINE
Payer: MEDICARE

## 2021-08-14 DIAGNOSIS — I26.99 OTHER ACUTE PULMONARY EMBOLISM WITHOUT ACUTE COR PULMONALE (HCC): Primary | ICD-10-CM

## 2021-08-14 DIAGNOSIS — I26.99 PULMONARY INFARCT (HCC): ICD-10-CM

## 2021-08-14 LAB
ALBUMIN SERPL-MCNC: 4.1 G/DL (ref 3.4–5)
ALP LIVER SERPL-CCNC: 111 U/L
ALT SERPL-CCNC: 27 U/L
ANION GAP SERPL CALC-SCNC: 8 MMOL/L (ref 0–18)
APTT PPP: 143.9 SECONDS (ref 23.2–35.3)
APTT PPP: 26.8 SECONDS (ref 23.2–35.3)
APTT PPP: 26.9 SECONDS (ref 23.2–35.3)
APTT PPP: >240 SECONDS (ref 23.2–35.3)
AST SERPL-CCNC: 16 U/L (ref 15–37)
BASOPHILS # BLD AUTO: 0.04 X10(3) UL (ref 0–0.2)
BASOPHILS NFR BLD AUTO: 0.3 %
BILIRUB DIRECT SERPL-MCNC: 0.2 MG/DL (ref 0–0.2)
BILIRUB SERPL-MCNC: 0.6 MG/DL (ref 0.1–2)
BUN BLD-MCNC: 26 MG/DL (ref 7–18)
BUN/CREAT SERPL: 23.4 (ref 10–20)
CALCIUM BLD-MCNC: 9.2 MG/DL (ref 8.5–10.1)
CHLORIDE SERPL-SCNC: 101 MMOL/L (ref 98–112)
CO2 SERPL-SCNC: 29 MMOL/L (ref 21–32)
CREAT BLD-MCNC: 1.11 MG/DL
D DIMER PPP FEU-MCNC: 1.52 UG/ML FEU (ref ?–0.65)
DEPRECATED RDW RBC AUTO: 43.7 FL (ref 35.1–46.3)
EOSINOPHIL # BLD AUTO: 0.28 X10(3) UL (ref 0–0.7)
EOSINOPHIL NFR BLD AUTO: 2 %
ERYTHROCYTE [DISTWIDTH] IN BLOOD BY AUTOMATED COUNT: 14.6 % (ref 11–15)
GLUCOSE BLD-MCNC: 148 MG/DL (ref 70–99)
HCT VFR BLD AUTO: 46.9 %
HGB BLD-MCNC: 14.9 G/DL
IMM GRANULOCYTES # BLD AUTO: 0.08 X10(3) UL (ref 0–1)
IMM GRANULOCYTES NFR BLD: 0.6 %
LYMPHOCYTES # BLD AUTO: 2.03 X10(3) UL (ref 1–4)
LYMPHOCYTES NFR BLD AUTO: 14.3 %
M PROTEIN MFR SERPL ELPH: 8 G/DL (ref 6.4–8.2)
MCH RBC QN AUTO: 26.2 PG (ref 26–34)
MCHC RBC AUTO-ENTMCNC: 31.8 G/DL (ref 31–37)
MCV RBC AUTO: 82.6 FL
MONOCYTES # BLD AUTO: 0.99 X10(3) UL (ref 0.1–1)
MONOCYTES NFR BLD AUTO: 7 %
NEUTROPHILS # BLD AUTO: 10.74 X10 (3) UL (ref 1.5–7.7)
NEUTROPHILS # BLD AUTO: 10.74 X10(3) UL (ref 1.5–7.7)
NEUTROPHILS NFR BLD AUTO: 75.8 %
NT-PROBNP SERPL-MCNC: 73 PG/ML (ref ?–125)
OSMOLALITY SERPL CALC.SUM OF ELEC: 294 MOSM/KG (ref 275–295)
PLATELET # BLD AUTO: 256 10(3)UL (ref 150–450)
POTASSIUM SERPL-SCNC: 3.5 MMOL/L (ref 3.5–5.1)
RBC # BLD AUTO: 5.68 X10(6)UL
SODIUM SERPL-SCNC: 138 MMOL/L (ref 136–145)
TROPONIN I SERPL-MCNC: <0.045 NG/ML (ref ?–0.04)
WBC # BLD AUTO: 14.2 X10(3) UL (ref 4–11)

## 2021-08-14 PROCEDURE — 93306 TTE W/DOPPLER COMPLETE: CPT | Performed by: EMERGENCY MEDICINE

## 2021-08-14 PROCEDURE — 80076 HEPATIC FUNCTION PANEL: CPT | Performed by: EMERGENCY MEDICINE

## 2021-08-14 PROCEDURE — 83880 ASSAY OF NATRIURETIC PEPTIDE: CPT | Performed by: EMERGENCY MEDICINE

## 2021-08-14 PROCEDURE — 93005 ELECTROCARDIOGRAM TRACING: CPT

## 2021-08-14 PROCEDURE — 99285 EMERGENCY DEPT VISIT HI MDM: CPT

## 2021-08-14 PROCEDURE — 85025 COMPLETE CBC W/AUTO DIFF WBC: CPT | Performed by: EMERGENCY MEDICINE

## 2021-08-14 PROCEDURE — 85730 THROMBOPLASTIN TIME PARTIAL: CPT | Performed by: HOSPITALIST

## 2021-08-14 PROCEDURE — 93970 EXTREMITY STUDY: CPT | Performed by: HOSPITALIST

## 2021-08-14 PROCEDURE — 71045 X-RAY EXAM CHEST 1 VIEW: CPT | Performed by: EMERGENCY MEDICINE

## 2021-08-14 PROCEDURE — 96365 THER/PROPH/DIAG IV INF INIT: CPT

## 2021-08-14 PROCEDURE — 85730 THROMBOPLASTIN TIME PARTIAL: CPT | Performed by: EMERGENCY MEDICINE

## 2021-08-14 PROCEDURE — 96376 TX/PRO/DX INJ SAME DRUG ADON: CPT

## 2021-08-14 PROCEDURE — 93010 ELECTROCARDIOGRAM REPORT: CPT | Performed by: EMERGENCY MEDICINE

## 2021-08-14 PROCEDURE — 85379 FIBRIN DEGRADATION QUANT: CPT | Performed by: EMERGENCY MEDICINE

## 2021-08-14 PROCEDURE — 96375 TX/PRO/DX INJ NEW DRUG ADDON: CPT

## 2021-08-14 PROCEDURE — 80048 BASIC METABOLIC PNL TOTAL CA: CPT | Performed by: EMERGENCY MEDICINE

## 2021-08-14 PROCEDURE — 71260 CT THORAX DX C+: CPT | Performed by: EMERGENCY MEDICINE

## 2021-08-14 PROCEDURE — 84484 ASSAY OF TROPONIN QUANT: CPT | Performed by: EMERGENCY MEDICINE

## 2021-08-14 RX ORDER — ESCITALOPRAM OXALATE 10 MG/1
20 TABLET ORAL DAILY
Status: DISCONTINUED | OUTPATIENT
Start: 2021-08-14 | End: 2021-08-19

## 2021-08-14 RX ORDER — METOCLOPRAMIDE HYDROCHLORIDE 5 MG/ML
10 INJECTION INTRAMUSCULAR; INTRAVENOUS EVERY 8 HOURS PRN
Status: DISCONTINUED | OUTPATIENT
Start: 2021-08-14 | End: 2021-08-19

## 2021-08-14 RX ORDER — QUETIAPINE 100 MG/1
300 TABLET, FILM COATED ORAL NIGHTLY
Status: DISCONTINUED | OUTPATIENT
Start: 2021-08-14 | End: 2021-08-19

## 2021-08-14 RX ORDER — HEPARIN SODIUM 1000 [USP'U]/ML
80 INJECTION, SOLUTION INTRAVENOUS; SUBCUTANEOUS ONCE
Status: COMPLETED | OUTPATIENT
Start: 2021-08-14 | End: 2021-08-14

## 2021-08-14 RX ORDER — ALBUTEROL SULFATE 90 UG/1
2 AEROSOL, METERED RESPIRATORY (INHALATION) EVERY 6 HOURS PRN
Status: DISCONTINUED | OUTPATIENT
Start: 2021-08-14 | End: 2021-08-19

## 2021-08-14 RX ORDER — ACETAMINOPHEN 325 MG/1
650 TABLET ORAL EVERY 6 HOURS PRN
Status: DISCONTINUED | OUTPATIENT
Start: 2021-08-14 | End: 2021-08-19

## 2021-08-14 RX ORDER — HEPARIN SODIUM AND DEXTROSE 10000; 5 [USP'U]/100ML; G/100ML
18 INJECTION INTRAVENOUS ONCE
Status: COMPLETED | OUTPATIENT
Start: 2021-08-14 | End: 2021-08-14

## 2021-08-14 RX ORDER — PREDNISONE 10 MG/1
5 TABLET ORAL DAILY
Status: DISCONTINUED | OUTPATIENT
Start: 2021-08-14 | End: 2021-08-19

## 2021-08-14 RX ORDER — PANTOPRAZOLE SODIUM 20 MG/1
20 TABLET, DELAYED RELEASE ORAL
Status: DISCONTINUED | OUTPATIENT
Start: 2021-08-14 | End: 2021-08-19

## 2021-08-14 RX ORDER — FOLIC ACID 1 MG/1
1 TABLET ORAL DAILY
Status: DISCONTINUED | OUTPATIENT
Start: 2021-08-14 | End: 2021-08-19

## 2021-08-14 RX ORDER — MORPHINE SULFATE 4 MG/ML
4 INJECTION, SOLUTION INTRAMUSCULAR; INTRAVENOUS ONCE
Status: COMPLETED | OUTPATIENT
Start: 2021-08-14 | End: 2021-08-14

## 2021-08-14 RX ORDER — GABAPENTIN 300 MG/1
300 CAPSULE ORAL 3 TIMES DAILY
Status: DISCONTINUED | OUTPATIENT
Start: 2021-08-14 | End: 2021-08-19

## 2021-08-14 RX ORDER — HYDROCODONE BITARTRATE AND ACETAMINOPHEN 10; 325 MG/1; MG/1
2 TABLET ORAL EVERY 4 HOURS PRN
Status: DISCONTINUED | OUTPATIENT
Start: 2021-08-14 | End: 2021-08-19

## 2021-08-14 RX ORDER — ONDANSETRON 2 MG/ML
4 INJECTION INTRAMUSCULAR; INTRAVENOUS EVERY 6 HOURS PRN
Status: DISCONTINUED | OUTPATIENT
Start: 2021-08-14 | End: 2021-08-19

## 2021-08-14 RX ORDER — HEPARIN SODIUM AND DEXTROSE 10000; 5 [USP'U]/100ML; G/100ML
INJECTION INTRAVENOUS CONTINUOUS
Status: DISPENSED | OUTPATIENT
Start: 2021-08-14 | End: 2021-08-17

## 2021-08-14 RX ORDER — POLYETHYLENE GLYCOL 3350 17 G/17G
17 POWDER, FOR SOLUTION ORAL DAILY PRN
Status: DISCONTINUED | OUTPATIENT
Start: 2021-08-14 | End: 2021-08-19

## 2021-08-14 RX ORDER — TAMSULOSIN HYDROCHLORIDE 0.4 MG/1
0.4 CAPSULE ORAL DAILY
Status: DISCONTINUED | OUTPATIENT
Start: 2021-08-14 | End: 2021-08-19

## 2021-08-14 RX ORDER — BISACODYL 10 MG
10 SUPPOSITORY, RECTAL RECTAL
Status: DISCONTINUED | OUTPATIENT
Start: 2021-08-14 | End: 2021-08-19

## 2021-08-14 RX ORDER — MORPHINE SULFATE 4 MG/ML
6 INJECTION, SOLUTION INTRAMUSCULAR; INTRAVENOUS ONCE
Status: COMPLETED | OUTPATIENT
Start: 2021-08-14 | End: 2021-08-14

## 2021-08-14 RX ORDER — BUSPIRONE HYDROCHLORIDE 5 MG/1
10 TABLET ORAL 3 TIMES DAILY
Status: DISCONTINUED | OUTPATIENT
Start: 2021-08-14 | End: 2021-08-19

## 2021-08-14 RX ORDER — HYDROCODONE BITARTRATE AND ACETAMINOPHEN 10; 325 MG/1; MG/1
1 TABLET ORAL EVERY 4 HOURS PRN
Status: DISCONTINUED | OUTPATIENT
Start: 2021-08-14 | End: 2021-08-14

## 2021-08-14 RX ORDER — MORPHINE SULFATE 4 MG/ML
6 INJECTION, SOLUTION INTRAMUSCULAR; INTRAVENOUS ONCE
Status: DISCONTINUED | OUTPATIENT
Start: 2021-08-14 | End: 2021-08-14

## 2021-08-14 RX ORDER — HYDROCODONE BITARTRATE AND ACETAMINOPHEN 10; 325 MG/1; MG/1
1 TABLET ORAL EVERY 4 HOURS PRN
Status: DISCONTINUED | OUTPATIENT
Start: 2021-08-14 | End: 2021-08-19

## 2021-08-14 NOTE — ED QUICK NOTES
Orders for admission, patient is aware of plan and ready to go upstairs.  Any questions, please call Doron CARRASQUILLO at [phone]  Type of COVID test sent: vaccinated x2  COVID Suspicion level: Low    Titratable drug(s) infusing:  Rate: Heparin for PE    LOC at time o

## 2021-08-14 NOTE — CONSULTS
Pulmonary H&P/Consult     NAME: Azucena Perez - ROOM: FJD00/IFP59-W - MRN: [id number] - Age: 72year old - :  1955    Date of Admission: 2021  2:52 AM  Admission Diagnosis: Pulmonary infarct ([facility] [address] [I26.99]  Other acute pulmonary embolism with APPENDECTOMY      ARTHROSCOPY OF JOINT UNLISTED      COLONOSCOPY  07/2018    COLONOSCOPY      KNEE REPLACEMENT SURGERY      bilat 2017    KNEE SCOPE,MED/LAT MENISECTOMY  9/27/07    Performed by Juanjose Leal at [place] [address] -10 ml     /63 (BP Location: Right arm)   Pulse 77   Temp 98.2 °F (36.8 °C) (Oral)   Resp 25   Ht 5' 9\" (1.753 m)   Wt 238 lb 8 oz (108.2 kg)   SpO2 92%   BMI 35.22 kg/m²   General: No distress  Skin: No rashes, no bruising  Eyes: EOMI, no icterus

## 2021-08-14 NOTE — PLAN OF CARE
Patient AxOx4. Patient comfortable on 5L O2, one at baseline. On heparin gtt for PE & pulmonary infarct, receiving norco PRN. Dopplers negative for DVT, echo pending. Normally independent of ADLs.     Problem: Patient Centered Care  Goal: Patient preference EKG if indicated  - Evaluate effectiveness of antiarrhythmic and heart rate control medications as ordered  - Initiate emergency measures for life threatening arrhythmias  - Monitor electrolytes and administer replacement therapy as ordered  Outcome: Progr Limit straining and forceful nose blowing  Outcome: Progressing     Problem: PAIN - ADULT  Goal: Verbalizes/displays adequate comfort level or patient's stated pain goal  Description: INTERVENTIONS:  - Encourage pt to monitor pain and request assistance  - patient/family/discharge partner  - Complete POLST form as appropriate  - Assess patient's ability to be responsible for managing their own health  - Refer to Case Management Department for coordinating discharge planning if the patient needs post-hospital

## 2021-08-14 NOTE — CM/SW NOTE
08/14/21 1400   CM/SW Referral Data   Referral Source Physician   Reason for Referral Discharge planning   Informant Spouse/Significant Other   Pertinent Medical Hx   Does patient have an established PCP?  Yes  (Dr. Lajuana Moritz)   Patient Info   Patient'

## 2021-08-14 NOTE — ED PROVIDER NOTES
Patient Seen in: [facility] AND [facility] Emergency Department      History   Patient presents with:  Chest Pain Angina  Difficulty Breathing    Stated Complaint: SOB, chest pain     HPI/Subjective:  And tolerated a roller there is a 1 regular home I will be her shoulder replacement   • OTHER SURGICAL HISTORY      L 1st toe fusion   • RECONSTR TOTAL SHOULDER IMPLANT Left 07/29/2019    Dr Vivian Anthony History    Tobacco Use      Smoking status: Former Smoker        Packs/day: 3.50        Years: 28.00 of motion. No edema or tenderness. Neurological: Alert and oriented to person, place, and time. No obvious focal deficit  Skin: Skin is warm and dry. Psychiatric: Normal mood and affect. Behavior is normal.   Nursing note and vitals reviewed.     Diff volumes. Streaky opacities in the lower lung fields could be due to atelectasis however correlate clinically for any signs of infection. Heart size is normal.  No pneumothorax or pleural effusion. Left shoulder hemiarthroplasty.       Results faxed/elect procedures.       Admission disposition: 8/14/2021  6:37 AM                                  Disposition and Plan     Clinical Impression:  Other acute pulmonary embolism without acute cor pulmonale (HCC)  (primary encounter diagnosis)  Pulmonary infarct (H

## 2021-08-14 NOTE — H&P
[facility] Hospitalist H&P     CC: Patient presents with:  Chest Pain Angina  Difficulty Breathing       PCP: Giuseppe Ramos MD      Assessment and Plan     William Delgado is a 72year old male with PMH sig for rheumatoid arthritis, major depressive disorder, gen [phone]    [facility] is a 72year old male with PMH sig for rheumatoid arthritis, major depressive disorder, generalized anxiety disorder, nonobstructive CAD, DISH, Ménière's disease, pancreatitis who presented acute onset shortness of br IMPLANT Left 07/29/2019    Dr Mario Warren        ALL:    Diclofenac              OTHER (SEE COMMENTS)    Comment:Upset stomach. Diarrhea.      Home Medications:  ESCITALOPRAM 20 MG Oral Tab, TAKE 1 TABLET BY MOUTH EVERY DAY, Disp: 30 tablet, Rfl: 0  BUSPIRONE 10 MG ovarian liver   • Dementia Mother    • Depression Sister    • Cancer Sister         lung   • Other (RA) Sister    • Cancer Brother         liver           Objective     Temp: 98.3 °F (36.8 °C)  Pulse: 65  Resp: 26  BP: 108/70    Exam  Gen: Moderate acute tissues are normal.  C2-C3: There is a mild disc bulge and mild bilateral facet arthrosis. There is no central spinal canal stenosis or foraminal stenosis.   C3-C4: There is moderate disc space narrowing and a diffuse disc osteophyte complex with resultant were reformatted at 2 mm in the sagittal and coronal planes. Automated exposure control and ALARA manual techniques for patient specific dose reduction were followed while maintaining the necessary diagnostic image quality.   FINDINGS:  There is L4-L5 grade as detailed above. CT SPINE THORACIC (CPT=72128)    Result Date: 8/6/2021  DATE OF SERVICE: 08.06.2021 INDICATION: 72 years -old Male with Diffuse idiopathic skeletal hyperostosis.   COMPARISON: June 24, 2021 radiographs; March 29, 2021 thoracic spine MR is hepatopetal flow. Peak portal venous velocity is 22.7 cm/sec. The IVC is patent. BILIARY SYSTEM:      GALLBLADDER: The gallbladder demonstrates no gross abnormalities. COMMON DUCT: The common bile duct is normal in caliber is 3 mm.       LANDRUM'S SI There are bibasilar airspace opacities, suggestive of atelectasis. Lungs are underinflated. BONES: No acute destructive process. There are degenerative changes within the thoracic spine. Total left shoulder arthroplasty.          CONCLUSION: Mild interst MEDIASTINUM/VASCULATURE: There are multiple mildly prominent mediastinal lymph nodes which are nonspecific and measure less than 1 cm in short axis. There is atherosclerotic calcification of the aortic arch and thoracic aorta.  The thoracic aorta is otherwi significant discrepancies.   elm-remote    Dictated by (CST): Ame Solis MD on 8/14/2021 at 7:11 AM     Finalized by (CST): Ame Solis MD on 8/14/2021 at 7:16 AM

## 2021-08-15 LAB
ANION GAP SERPL CALC-SCNC: 6 MMOL/L (ref 0–18)
APTT PPP: 139 SECONDS (ref 23.2–35.3)
APTT PPP: 70.7 SECONDS (ref 23.2–35.3)
BASOPHILS # BLD AUTO: 0.06 X10(3) UL (ref 0–0.2)
BASOPHILS NFR BLD AUTO: 0.5 %
BUN BLD-MCNC: 19 MG/DL (ref 7–18)
BUN/CREAT SERPL: 24.1 (ref 10–20)
CALCIUM BLD-MCNC: 8.6 MG/DL (ref 8.5–10.1)
CHLORIDE SERPL-SCNC: 101 MMOL/L (ref 98–112)
CO2 SERPL-SCNC: 30 MMOL/L (ref 21–32)
CREAT BLD-MCNC: 0.79 MG/DL
DEPRECATED RDW RBC AUTO: 44.9 FL (ref 35.1–46.3)
EOSINOPHIL # BLD AUTO: 0.24 X10(3) UL (ref 0–0.7)
EOSINOPHIL NFR BLD AUTO: 1.8 %
ERYTHROCYTE [DISTWIDTH] IN BLOOD BY AUTOMATED COUNT: 14.8 % (ref 11–15)
GLUCOSE BLD-MCNC: 108 MG/DL (ref 70–99)
HCT VFR BLD AUTO: 40.1 %
HGB BLD-MCNC: 12.9 G/DL
IMM GRANULOCYTES # BLD AUTO: 0.05 X10(3) UL (ref 0–1)
IMM GRANULOCYTES NFR BLD: 0.4 %
LYMPHOCYTES # BLD AUTO: 1.5 X10(3) UL (ref 1–4)
LYMPHOCYTES NFR BLD AUTO: 11.4 %
MCH RBC QN AUTO: 26.8 PG (ref 26–34)
MCHC RBC AUTO-ENTMCNC: 32.2 G/DL (ref 31–37)
MCV RBC AUTO: 83.4 FL
MONOCYTES # BLD AUTO: 1.11 X10(3) UL (ref 0.1–1)
MONOCYTES NFR BLD AUTO: 8.4 %
NEUTROPHILS # BLD AUTO: 10.2 X10 (3) UL (ref 1.5–7.7)
NEUTROPHILS # BLD AUTO: 10.2 X10(3) UL (ref 1.5–7.7)
NEUTROPHILS NFR BLD AUTO: 77.5 %
OSMOLALITY SERPL CALC.SUM OF ELEC: 287 MOSM/KG (ref 275–295)
PLATELET # BLD AUTO: 204 10(3)UL (ref 150–450)
POTASSIUM SERPL-SCNC: 3.5 MMOL/L (ref 3.5–5.1)
RBC # BLD AUTO: 4.81 X10(6)UL
SODIUM SERPL-SCNC: 137 MMOL/L (ref 136–145)
WBC # BLD AUTO: 13.2 X10(3) UL (ref 4–11)

## 2021-08-15 PROCEDURE — 85730 THROMBOPLASTIN TIME PARTIAL: CPT | Performed by: HOSPITALIST

## 2021-08-15 PROCEDURE — 85025 COMPLETE CBC W/AUTO DIFF WBC: CPT | Performed by: HOSPITALIST

## 2021-08-15 PROCEDURE — 80048 BASIC METABOLIC PNL TOTAL CA: CPT | Performed by: HOSPITALIST

## 2021-08-15 NOTE — PROGRESS NOTES
Pulmonary Progress Note      NAME: Marielena Gil - ROOM: WXI79/EPY63-S - MRN: [id number] - Age: 72year old - : 1955    Assessment/Plan:  Pulmonary embolism  - segmental and subsegmental with pulmonary infarct  - Con't full anticoagulation  - Trop CA 9.2 8.6   ALB 4.1  --     137   K 3.5 3.5    101   CO2 29.0 30.0   ALKPHO 111  --    AST 16  --    ALT 27  --    BILT 0.6  --    TP 8.0  --      Imaging: I independently visualized all relevant chest imaging in PACS, agree with radiology i

## 2021-08-15 NOTE — PROGRESS NOTES
[facility] Hospitalist Progress Note     PCP: Vi Silverio MD    CC: Follow up       Assessment/Plan:   Jose Castrejon is a 72year old male with PMH sig for rheumatoid arthritis, major depressive disorder, generalized anxiety disorder, nonobstructive CAD, DISH (105116)/(6678) 105/67    Intake/Output:    Intake/Output Summary (Last 24 hours) at 8/15/2021 1520  Last data filed at 8/15/2021 1346  Gross per 24 hour   Intake 494.75 ml   Output 1025 ml   Net -530.25 ml       Last 3 Weights  08/15/21 0614 : 240 lb 3. last 168 hours. Recent Labs   Lab 08/14/21  0315   TROP <0.045       Imaging:CT SPINE CERVICAL (CPT=72125)    Result Date: 8/6/2021  IMPRESSION:  1. No acute fracture or malalignment in the cervical spine.  2. Moderate multilevel cervical degenerative di and there are no significant discrepancies.   elm-remote    Dictated by (CST): Lise Hernandez MD on 8/14/2021 at 8:32 AM     Finalized by (CST): Lise Hernandez MD on 8/14/2021 at 8:33 AM          CT CHEST PE AORTA (IV ONLY) (CPT=71260)    Result Date: 8/14/2021

## 2021-08-15 NOTE — PLAN OF CARE
Problem: Patient Centered Care  Goal: Patient preferences are identified and integrated in the patient's plan of care  Description: Intervention  - What would you like us to know as we care for you?   - Provide timely, complete, and accurate information administer replacement therapy as ordered  Outcome: Progressing     Problem: RESPIRATORY - ADULT  Goal: Achieves optimal ventilation and oxygenation  Description: INTERVENTIONS:  - Assess for changes in respiratory status  - Assess for changes in mentation INTERVENTIONS:  - Encourage pt to monitor pain and request assistance  - Assess pain using appropriate pain scale  - Administer analgesics based on type and severity of pain and evaluate response  - Implement non-pharmacological measures as appropriate and coordinating discharge planning if the patient needs post-hospital services based on physician/LIP order or complex needs related to functional status, cognitive ability or social support system  Outcome: Progressing   Patient on Heparin @ 11 mL/hr.  8L HF

## 2021-08-15 NOTE — PLAN OF CARE
HEPARIN GTT MAINTAINED OVERNIGHT, PAIN MEDS PRN, O2 TITRATED PER NEEDS.    Problem: Patient Centered Care  Goal: Patient preferences are identified and integrated in the patient's plan of care  Description: Interventions:  - What would you like us to know a measures for life threatening arrhythmias  - Monitor electrolytes and administer replacement therapy as ordered  Outcome: Progressing     Problem: RESPIRATORY - ADULT  Goal: Achieves optimal ventilation and oxygenation  Description: INTERVENTIONS:  - Asses

## 2021-08-16 LAB
APTT PPP: 62.3 SECONDS (ref 23.2–35.3)
APTT PPP: 63.2 SECONDS (ref 23.2–35.3)
APTT PPP: 83.1 SECONDS (ref 23.2–35.3)

## 2021-08-16 PROCEDURE — 85730 THROMBOPLASTIN TIME PARTIAL: CPT | Performed by: HOSPITALIST

## 2021-08-16 NOTE — PROGRESS NOTES
Pulmonary Progress Note     Assessment / Plan:  1. Acute respiratory failure - due to PE. Likely has undiagnosed CHRIS as well  - wean supplemental O2 as able  2. Pulmonary embolism - segmental and subsegmental with pulmonary infarct.  TTE with normal RV size

## 2021-08-16 NOTE — PAYOR COMM NOTE
--------------  ADMISSION REVIEW     Payor: [facility] [employer] [place] #:  [id number]  Authorization Number: [id number]    ED Provider Notes        History   Patient presents with:  Chest Pain Angina  Difficulty Breathing    Stated Complaint: SO chest tube    • OTHER SURGICAL HISTORY      sinus surgery   • OTHER SURGICAL HISTORY      L shoulder replacement   • OTHER SURGICAL HISTORY      L 1st toe fusion   • RECONSTR TOTAL SHOULDER IMPLANT Left 07/29/2019    Dr Kasandra Watson           Review of Systems    P bronchopneumonia, rib fracture.       ED Course     Labs Reviewed   BASIC METABOLIC PANEL (8) - Abnormal; Notable for the following components:       Result Value    Glucose 148 (*)     BUN 26 (*)     BUN/CREA Ratio 23.4 (*)     All other components within was given initially the patient's blood pressure was low. EKG troponin and BMP are unremarkable. CT as above. Patient will be started on a heparin drip. He will be admitted to the [facility] hospitalist with [facility] pulmonary on consult.   2D echo ordered for D disease, pancreatitis who presented acute onset shortness of breath and found to be hypoxic in the ER.   CT positive for PE in anterior right upper lobe, right middle lobe and anterior lateral right lobe with associated pulmonary infarct in the lateral righ 14.2* 13.2*   HGB 14.9 12.9*   MCV 82.6 83.4   .0 204. 0              Recent Labs   Lab 08/14/21  0315 08/15/21  0537    137   K 3.5 3.5    101   CO2 29.0 30.0   BUN 26* 19*   CREATSERUM 1.11 0.79   CA 9.2 8.6   * 108*         Rece 8/16/2021 1036 Given 17 g Oral Lacy Cisneros RN      predniSONE (DELTASONE) tab 5 mg     Date Action Dose Route User    8/16/2021 3408 Given 5 mg Oral Lacy Cisneros RN      QUEtiapine Fumarate (SEROQUEL) tab 300 mg     Date Action Dose Route

## 2021-08-16 NOTE — PLAN OF CARE
No acute changes overnight. Heparin infusing @ 11. Next PTT is at 05:00. Plan is to DC home once cleared.     Problem: Patient Centered Care  Goal: Patient preferences are identified and integrated in the patient's plan of care  Description: Interventions: baseline  Description: INTERVENTIONS:  - Continuous cardiac monitoring, monitor vital signs, obtain 12 lead EKG if indicated  - Evaluate effectiveness of antiarrhythmic and heart rate control medications as ordered  - Initiate emergency measures for life t Avoid use of toothpicks and dental floss  - Use electric shaver for shaving  - Use soft bristle tooth brush  - Limit straining and forceful nose blowing  Outcome: Progressing     Problem: PAIN - ADULT  Goal: Verbalizes/displays adequate comfort level or pa Arrange for interpreters to assist at discharge as needed  - Consider post-discharge preferences of patient/family/discharge partner  - Complete POLST form as appropriate  - Assess patient's ability to be responsible for managing their own health  - Refer

## 2021-08-16 NOTE — PLAN OF CARE
Problem: Patient Centered Care  Goal: Patient preferences are identified and integrated in the patient's plan of care  Description: Interventions:  - What would you like us to know as we care for you?  I live at home with my wife  - Provide timely, comple indicated  - Evaluate effectiveness of antiarrhythmic and heart rate control medications as ordered  - Initiate emergency measures for life threatening arrhythmias  - Monitor electrolytes and administer replacement therapy as ordered  Outcome: Progressing straining and forceful nose blowing  Outcome: Progressing     Problem: PAIN - ADULT  Goal: Verbalizes/displays adequate comfort level or patient's stated pain goal  Description: INTERVENTIONS:  - Encourage pt to monitor pain and request assistance  - Asses patient/family/discharge partner  - Complete POLST form as appropriate  - Assess patient's ability to be responsible for managing their own health  - Refer to Case Management Department for coordinating discharge planning if the patient needs post-hospital

## 2021-08-16 NOTE — PROGRESS NOTES
[facility] Hospitalist Progress Note     PCP: Jorje Blanchard MD    CC: Follow up       Assessment/Plan:   Lissy Chavira is a 72year old male with PMH sig for rheumatoid arthritis, major depressive disorder, generalized anxiety disorder, nonobstructive CAD, DISH °C)  Pulse:  [60-78] 70  Resp:  [18-20] 20  BP: ()/(55-77) 104/59    Intake/Output:    Intake/Output Summary (Last 24 hours) at 8/16/2021 1258  Last data filed at 8/16/2021 0932  Gross per 24 hour   Intake 563.5 ml   Output 925 ml   Net -361.5 ml Recent Labs   Lab 08/14/21 0315   ALT 27   AST 16   ALB 4.1       No results for input(s): PGLU in the last 168 hours.     Recent Labs   Lab 08/14/21 0315   TROP <0.045       Imaging:CT SPINE CERVICAL (CPT=72125)    Result Date: 8/6/2021  IMPRESSION less likely but is also in the differential.  Preliminary report was submitted by the [facility] teleradiologist and there are no significant discrepancies.   elm-remote    Dictated by (CST): Dayne Aschoff, MD on 8/14/2021 at 8:32 AM     Finalized by (CST): Sofi

## 2021-08-17 LAB
ANION GAP SERPL CALC-SCNC: 4 MMOL/L (ref 0–18)
APTT PPP: 107 SECONDS (ref 23.2–35.3)
APTT PPP: 71 SECONDS (ref 23.2–35.3)
BASOPHILS # BLD AUTO: 0.05 X10(3) UL (ref 0–0.2)
BASOPHILS NFR BLD AUTO: 0.6 %
BUN BLD-MCNC: 12 MG/DL (ref 7–18)
BUN/CREAT SERPL: 17.4 (ref 10–20)
CALCIUM BLD-MCNC: 8.4 MG/DL (ref 8.5–10.1)
CHLORIDE SERPL-SCNC: 106 MMOL/L (ref 98–112)
CO2 SERPL-SCNC: 31 MMOL/L (ref 21–32)
CREAT BLD-MCNC: 0.69 MG/DL
DEPRECATED RDW RBC AUTO: 45.6 FL (ref 35.1–46.3)
EOSINOPHIL # BLD AUTO: 0.3 X10(3) UL (ref 0–0.7)
EOSINOPHIL NFR BLD AUTO: 3.9 %
ERYTHROCYTE [DISTWIDTH] IN BLOOD BY AUTOMATED COUNT: 14.9 % (ref 11–15)
GLUCOSE BLD-MCNC: 104 MG/DL (ref 70–99)
HCT VFR BLD AUTO: 38.2 %
HGB BLD-MCNC: 12.2 G/DL
IMM GRANULOCYTES # BLD AUTO: 0.06 X10(3) UL (ref 0–1)
IMM GRANULOCYTES NFR BLD: 0.8 %
LYMPHOCYTES # BLD AUTO: 1.89 X10(3) UL (ref 1–4)
LYMPHOCYTES NFR BLD AUTO: 24.4 %
MCH RBC QN AUTO: 26.8 PG (ref 26–34)
MCHC RBC AUTO-ENTMCNC: 31.9 G/DL (ref 31–37)
MCV RBC AUTO: 84 FL
MONOCYTES # BLD AUTO: 0.7 X10(3) UL (ref 0.1–1)
MONOCYTES NFR BLD AUTO: 9 %
NEUTROPHILS # BLD AUTO: 4.74 X10 (3) UL (ref 1.5–7.7)
NEUTROPHILS # BLD AUTO: 4.74 X10(3) UL (ref 1.5–7.7)
NEUTROPHILS NFR BLD AUTO: 61.3 %
OSMOLALITY SERPL CALC.SUM OF ELEC: 292 MOSM/KG (ref 275–295)
PLATELET # BLD AUTO: 249 10(3)UL (ref 150–450)
POTASSIUM SERPL-SCNC: 3.5 MMOL/L (ref 3.5–5.1)
RBC # BLD AUTO: 4.55 X10(6)UL
SODIUM SERPL-SCNC: 141 MMOL/L (ref 136–145)
WBC # BLD AUTO: 7.7 X10(3) UL (ref 4–11)

## 2021-08-17 PROCEDURE — 85730 THROMBOPLASTIN TIME PARTIAL: CPT | Performed by: HOSPITALIST

## 2021-08-17 PROCEDURE — 80048 BASIC METABOLIC PNL TOTAL CA: CPT | Performed by: HOSPITALIST

## 2021-08-17 PROCEDURE — 85025 COMPLETE CBC W/AUTO DIFF WBC: CPT | Performed by: HOSPITALIST

## 2021-08-17 NOTE — PLAN OF CARE
Pain medication given with relief. No changes overnight. Heparin gtt @ 15. PTT in am. Patient on 4L NC. Plan will be to DC home once medically cleared.     Problem: Patient Centered Care  Goal: Patient preferences are identified and integrated in the patien Absence of cardiac arrhythmias or at baseline  Description: INTERVENTIONS:  - Continuous cardiac monitoring, monitor vital signs, obtain 12 lead EKG if indicated  - Evaluate effectiveness of antiarrhythmic and heart rate control medications as ordered  - I abnormal signs of bleeding to staff  - Avoid use of toothpicks and dental floss  - Use electric shaver for shaving  - Use soft bristle tooth brush  - Limit straining and forceful nose blowing  Outcome: Progressing     Problem: PAIN - ADULT  Goal: Verbalize learning needs (meds, wound care, etc)  - Arrange for interpreters to assist at discharge as needed  - Consider post-discharge preferences of patient/family/discharge partner  - Complete POLST form as appropriate  - Assess patient's ability to be responsib

## 2021-08-17 NOTE — PROGRESS NOTES
[facility] Hospitalist Progress Note     PCP: Cindy Cochran MD    CC: Follow up       Assessment/Plan:   Isacc Ray is a 72year old male with PMH sig for rheumatoid arthritis, major depressive disorder, generalized anxiety disorder, nonobstructive CAD, DISH Obey Ford M.D. 8/17/21  [facility] Hospitalist  Answering Service: [phone]        Subjective      Continues to feel better. Pain improving, constipated today but feels like suppository is working soon.   Afebrile  no n/v/d      Objective     OBJECTIV Sulfate HFA, HYDROcodone-acetaminophen **OR** HYDROcodone-acetaminophen    Data Review:       Labs:     Recent Labs   Lab 08/14/21  0315 08/15/21  0537 08/17/21  0727   WBC 14.2* 13.2* 7.7   HGB 14.9 12.9* 12.2*   MCV 82.6 83.4 84.0   .0 204.0 249. 0 - [facility] (AGO=[id number])    Result Date: 8/14/2021  CONCLUSION:  1.  Normal bilateral venous duplex exam. 2. No deep venous thrombosis    Dictated by (CST): Zuleima Bull MD on 8/14/2021 at 2:54 PM     Finalized by (CST): Zuleima Bull MD on 8/14/

## 2021-08-18 ENCOUNTER — APPOINTMENT (OUTPATIENT)
Dept: GENERAL RADIOLOGY | Facility: HOSPITAL | Age: 66
DRG: 175 | End: 2021-08-18
Attending: STUDENT IN AN ORGANIZED HEALTH CARE EDUCATION/TRAINING PROGRAM
Payer: MEDICARE

## 2021-08-18 LAB
APTT PPP: 58.3 SECONDS (ref 23.2–35.3)
BASOPHILS # BLD AUTO: 0.04 X10(3) UL (ref 0–0.2)
BASOPHILS NFR BLD AUTO: 0.5 %
DEPRECATED RDW RBC AUTO: 46.8 FL (ref 35.1–46.3)
EOSINOPHIL # BLD AUTO: 0.37 X10(3) UL (ref 0–0.7)
EOSINOPHIL NFR BLD AUTO: 4.5 %
ERYTHROCYTE [DISTWIDTH] IN BLOOD BY AUTOMATED COUNT: 15.4 % (ref 11–15)
HCT VFR BLD AUTO: 39.1 %
HGB BLD-MCNC: 12.7 G/DL
IMM GRANULOCYTES # BLD AUTO: 0.09 X10(3) UL (ref 0–1)
IMM GRANULOCYTES NFR BLD: 1.1 %
LYMPHOCYTES # BLD AUTO: 0.89 X10(3) UL (ref 1–4)
LYMPHOCYTES NFR BLD AUTO: 10.9 %
MCH RBC QN AUTO: 27.1 PG (ref 26–34)
MCHC RBC AUTO-ENTMCNC: 32.5 G/DL (ref 31–37)
MCV RBC AUTO: 83.4 FL
MONOCYTES # BLD AUTO: 0.51 X10(3) UL (ref 0.1–1)
MONOCYTES NFR BLD AUTO: 6.3 %
NEUTROPHILS # BLD AUTO: 6.25 X10 (3) UL (ref 1.5–7.7)
NEUTROPHILS # BLD AUTO: 6.25 X10(3) UL (ref 1.5–7.7)
NEUTROPHILS NFR BLD AUTO: 76.7 %
PLATELET # BLD AUTO: 263 10(3)UL (ref 150–450)
PROCALCITONIN SERPL-MCNC: 0.04 NG/ML (ref ?–0.16)
RBC # BLD AUTO: 4.69 X10(6)UL
WBC # BLD AUTO: 8.2 X10(3) UL (ref 4–11)

## 2021-08-18 PROCEDURE — 71045 X-RAY EXAM CHEST 1 VIEW: CPT | Performed by: STUDENT IN AN ORGANIZED HEALTH CARE EDUCATION/TRAINING PROGRAM

## 2021-08-18 PROCEDURE — 85025 COMPLETE CBC W/AUTO DIFF WBC: CPT | Performed by: HOSPITALIST

## 2021-08-18 PROCEDURE — 84145 PROCALCITONIN (PCT): CPT | Performed by: STUDENT IN AN ORGANIZED HEALTH CARE EDUCATION/TRAINING PROGRAM

## 2021-08-18 PROCEDURE — 85730 THROMBOPLASTIN TIME PARTIAL: CPT | Performed by: HOSPITALIST

## 2021-08-18 NOTE — PLAN OF CARE
Problem: Patient Centered Care  Goal: Patient preferences are identified and integrated in the patient's plan of care  Description: Interventions:  - What would you like us to know as we care for you?  I live at home with my wife  - Provide timely, comple ADULT  Goal: Maintains hematologic stability  Description: INTERVENTIONS  - Assess for signs and symptoms of bleeding or hemorrhage  - Monitor labs and vital signs for trends  - Administer supportive blood products/factors, fluids and medications as ordere

## 2021-08-18 NOTE — PROGRESS NOTES
Pulmonary Progress Note     Assessment / Plan:  1. Acute respiratory failure - due to PE. Likely has undiagnosed CHRIS as well  - wean supplemental O2 as able  - walking test today with borderline sats & dyspnea  2.  Pulmonary embolism - segmental and subsegm

## 2021-08-18 NOTE — CM/SW NOTE
10: 40AM  Received MDO for Xarelto coverage check. YURI spoke w/ DC NEIDA Sena - confirmed per RN rounds this AM, pt may be cleared for DC today. HARRY Sena confirmed she will check on Xarelto coverage.     YURI messaged NEIDA/Tarah and requested notice

## 2021-08-18 NOTE — PROGRESS NOTES
[facility] Hospitalist Progress Note     PCP: Maximo Lundberg MD    CC: Follow up       Assessment/Plan:   Jama Spence is a 72year old male with PMH sig for rheumatoid arthritis, major depressive disorder, generalized anxiety disorder, nonobstructive CAD, DISH James,  Jennie Muller DO  [facility] Hospitalist  Answering Service: [phone]        Subjective      Still requiring oxygen. Desats to 88 when ambulating. Complains of cough and shortness of breath. Denies any chest pain nausea vomiting fever or chills. Sulfate HFA, HYDROcodone-acetaminophen **OR** HYDROcodone-acetaminophen    Data Review:       Labs:     Recent Labs   Lab 08/14/21  0315 08/15/21  0537 08/17/21  0727 08/18/21  1040   WBC 14.2* 13.2* 7.7 8.2   HGB 14.9 12.9* 12.2* 12.7*   MCV 82.6 83.4 84. cyst.     US VENOUS DOPPLER LEG BILAT - DIAG IMG (CPT=93970)    Result Date: 8/14/2021  CONCLUSION:  1.  Normal bilateral venous duplex exam. 2. No deep venous thrombosis    Dictated by (CST): Moiz Blevins MD on 8/14/2021 at 2:54 PM     Finalized by

## 2021-08-19 VITALS
SYSTOLIC BLOOD PRESSURE: 122 MMHG | HEIGHT: 69 IN | WEIGHT: 239 LBS | BODY MASS INDEX: 35.4 KG/M2 | OXYGEN SATURATION: 95 % | TEMPERATURE: 98 F | DIASTOLIC BLOOD PRESSURE: 69 MMHG | RESPIRATION RATE: 16 BRPM | HEART RATE: 65 BPM

## 2021-08-19 LAB
ANION GAP SERPL CALC-SCNC: 7 MMOL/L (ref 0–18)
BASOPHILS # BLD AUTO: 0.06 X10(3) UL (ref 0–0.2)
BASOPHILS NFR BLD AUTO: 0.6 %
BUN BLD-MCNC: 15 MG/DL (ref 7–18)
BUN/CREAT SERPL: 19.2 (ref 10–20)
CALCIUM BLD-MCNC: 9 MG/DL (ref 8.5–10.1)
CHLORIDE SERPL-SCNC: 107 MMOL/L (ref 98–112)
CO2 SERPL-SCNC: 28 MMOL/L (ref 21–32)
CREAT BLD-MCNC: 0.78 MG/DL
DEPRECATED RDW RBC AUTO: 46.2 FL (ref 35.1–46.3)
EOSINOPHIL # BLD AUTO: 0.57 X10(3) UL (ref 0–0.7)
EOSINOPHIL NFR BLD AUTO: 6 %
ERYTHROCYTE [DISTWIDTH] IN BLOOD BY AUTOMATED COUNT: 15.2 % (ref 11–15)
GLUCOSE BLD-MCNC: 94 MG/DL (ref 70–99)
HCT VFR BLD AUTO: 41.5 %
HGB BLD-MCNC: 13.3 G/DL
IMM GRANULOCYTES # BLD AUTO: 0.13 X10(3) UL (ref 0–1)
IMM GRANULOCYTES NFR BLD: 1.4 %
LYMPHOCYTES # BLD AUTO: 1.81 X10(3) UL (ref 1–4)
LYMPHOCYTES NFR BLD AUTO: 18.9 %
MCH RBC QN AUTO: 26.7 PG (ref 26–34)
MCHC RBC AUTO-ENTMCNC: 32 G/DL (ref 31–37)
MCV RBC AUTO: 83.2 FL
MONOCYTES # BLD AUTO: 0.75 X10(3) UL (ref 0.1–1)
MONOCYTES NFR BLD AUTO: 7.8 %
NEUTROPHILS # BLD AUTO: 6.24 X10 (3) UL (ref 1.5–7.7)
NEUTROPHILS # BLD AUTO: 6.24 X10(3) UL (ref 1.5–7.7)
NEUTROPHILS NFR BLD AUTO: 65.3 %
OSMOLALITY SERPL CALC.SUM OF ELEC: 295 MOSM/KG (ref 275–295)
PLATELET # BLD AUTO: 296 10(3)UL (ref 150–450)
POTASSIUM SERPL-SCNC: 3.7 MMOL/L (ref 3.5–5.1)
RBC # BLD AUTO: 4.99 X10(6)UL
SODIUM SERPL-SCNC: 142 MMOL/L (ref 136–145)
WBC # BLD AUTO: 9.6 X10(3) UL (ref 4–11)

## 2021-08-19 PROCEDURE — 85025 COMPLETE CBC W/AUTO DIFF WBC: CPT | Performed by: STUDENT IN AN ORGANIZED HEALTH CARE EDUCATION/TRAINING PROGRAM

## 2021-08-19 PROCEDURE — 80048 BASIC METABOLIC PNL TOTAL CA: CPT | Performed by: STUDENT IN AN ORGANIZED HEALTH CARE EDUCATION/TRAINING PROGRAM

## 2021-08-19 RX ORDER — ACETAMINOPHEN 325 MG/1
650 TABLET ORAL EVERY 6 HOURS PRN
Refills: 0 | Status: SHIPPED | COMMUNITY
Start: 2021-08-19

## 2021-08-19 NOTE — CM/SW NOTE
Received call from NEIDA/Krystin Oscar sent Xarelto Rx to pt's pharmacy and asking SW to check coverage. SW checked Rx yesterday as requested. Per RN, pt will now go on 15mg BID for 20 days then different dose after that.  RN stated MD sent Rx electron

## 2021-08-19 NOTE — PLAN OF CARE
Problem: Patient Centered Care  Goal: Patient preferences are identified and integrated in the patient's plan of care  Description: Interventions:  - What would you like us to know as we care for you?  I live at home with my wife  - Provide timely, comple indicated  - Evaluate effectiveness of antiarrhythmic and heart rate control medications as ordered  - Initiate emergency measures for life threatening arrhythmias  - Monitor electrolytes and administer replacement therapy as ordered  Outcome: Progressing response  - Implement non-pharmacological measures as appropriate and evaluate response  - Consider cultural and social influences on pain and pain management  - Manage/alleviate anxiety  - Utilize distraction and/or relaxation techniques  - Monitor for op

## 2021-08-20 NOTE — PAYOR COMM NOTE
Discharge Notification    Patient Name: Nga Rolling: Aleksander Klein #: [id number]  Authorization Number: [id number]  Admit Date/Time: 8/14/2021 2:52 AM  Discharge Date/Time: 8/19/2021 2:09 PM

## 2021-08-21 NOTE — DISCHARGE SUMMARY
[facility]  General Medicine Discharge Summary     Patient ID:  Shabbir Jones  72year old  12/9/1955    Admit date: 8/14/2021    Discharge date and time: 8/19/2021  2:09 PM     Attending Physician: Dr. Marquise Roca: Manjit Berrios continued respiratory failure and cough–within normal  -Pulmonology consult     Noncardiac chest pain due to pulmonary infarct->improving   -Add as needed Norco for pain control     History of major depressive disorder and generalized anxiety disorder, in tablet (1 mg total) by mouth daily. gabapentin 300 MG Caps  Commonly known as: NEURONTIN  Take 1 capsule (300 mg total) by mouth 3 (three) times daily. Meclizine HCl 25 MG Tabs  Commonly known as:  Antivert  Take 1 tablet (25 mg total) by mouth 3 (t

## 2021-08-24 NOTE — PROGRESS NOTES
High leptin and hsCRP- decreasing carbs and increasing exercise will help  Vit d, vit b12- ok   High insulin   High a1c- prediabetes

## 2021-08-25 PROBLEM — M06.9 RHEUMATOID ARTHRITIS, INVOLVING UNSPECIFIED SITE, UNSPECIFIED WHETHER RHEUMATOID FACTOR PRESENT (HCC): Status: ACTIVE | Noted: 2021-08-25

## 2021-08-27 ENCOUNTER — HOSPITAL ENCOUNTER (OUTPATIENT)
Dept: CT IMAGING | Facility: HOSPITAL | Age: 66
Discharge: HOME | End: 2021-08-27
Attending: STUDENT IN AN ORGANIZED HEALTH CARE EDUCATION/TRAINING PROGRAM
Payer: MEDICARE

## 2021-08-27 DIAGNOSIS — R07.81 PLEURITIC CHEST PAIN: ICD-10-CM

## 2021-08-27 DIAGNOSIS — I26.94 MULTIPLE SUBSEGMENTAL PULMONARY EMBOLI WITHOUT ACUTE COR PULMONALE (HCC): ICD-10-CM

## 2021-08-27 PROCEDURE — 71260 CT THORAX DX C+: CPT | Performed by: STUDENT IN AN ORGANIZED HEALTH CARE EDUCATION/TRAINING PROGRAM

## 2021-09-07 NOTE — ED INITIAL ASSESSMENT (HPI)
Pt reports severe chest pain since last night and reports waking up w/ trouble breathing tonight. Per triage pt 86% on RA, placed on 10LPM via NC by PCT And now 91%. If you are a smoker, it is important for your health to stop smoking. Please be aware that second hand smoke is also harmful.

## 2022-01-04 PROBLEM — G47.33 OSA (OBSTRUCTIVE SLEEP APNEA): Status: ACTIVE | Noted: 2022-01-04

## 2022-01-31 PROBLEM — I70.0 AORTIC ATHEROSCLEROSIS (HCC): Status: ACTIVE | Noted: 2022-01-31

## 2022-01-31 PROBLEM — I27.20 PULMONARY HYPERTENSION (HCC): Status: ACTIVE | Noted: 2022-01-31

## 2022-01-31 PROBLEM — I70.0 AORTIC ATHEROSCLEROSIS: Status: ACTIVE | Noted: 2022-01-31

## 2022-03-29 PROBLEM — E78.2 MIXED HYPERLIPIDEMIA: Status: ACTIVE | Noted: 2022-03-29

## 2024-12-12 ENCOUNTER — LAB ENCOUNTER (OUTPATIENT)
Dept: LAB | Age: 69
End: 2024-12-12
Attending: PHYSICIAN ASSISTANT
Payer: MEDICARE

## 2024-12-12 DIAGNOSIS — Z79.899 MEDICATION MANAGEMENT: ICD-10-CM

## 2024-12-12 LAB
ATRIAL RATE: 56 BPM
P AXIS: 66 DEGREES
P-R INTERVAL: 202 MS
Q-T INTERVAL: 430 MS
QRS DURATION: 92 MS
QTC CALCULATION (BEZET): 414 MS
R AXIS: 24 DEGREES
T AXIS: 57 DEGREES
VENTRICULAR RATE: 56 BPM

## 2024-12-12 PROCEDURE — 93005 ELECTROCARDIOGRAM TRACING: CPT

## 2024-12-12 PROCEDURE — 93010 ELECTROCARDIOGRAM REPORT: CPT | Performed by: INTERNAL MEDICINE

## 2025-03-28 RX ORDER — ECONAZOLE NITRATE 10 MG/G
1 CREAM TOPICAL 2 TIMES DAILY
COMMUNITY
Start: 2024-11-19

## 2025-03-29 ENCOUNTER — LAB ENCOUNTER (OUTPATIENT)
Dept: LAB | Age: 70
End: 2025-03-29
Attending: ORTHOPAEDIC SURGERY
Payer: MEDICARE

## 2025-03-29 DIAGNOSIS — Z01.818 PRE-OP TESTING: ICD-10-CM

## 2025-03-29 LAB
ANION GAP SERPL CALC-SCNC: 3 MMOL/L (ref 0–18)
BUN BLD-MCNC: 20 MG/DL (ref 9–23)
BUN/CREAT SERPL: 24.4 (ref 10–20)
CALCIUM BLD-MCNC: 9 MG/DL (ref 8.7–10.4)
CHLORIDE SERPL-SCNC: 106 MMOL/L (ref 98–112)
CO2 SERPL-SCNC: 30 MMOL/L (ref 21–32)
CREAT BLD-MCNC: 0.82 MG/DL
EGFRCR SERPLBLD CKD-EPI 2021: 95 ML/MIN/1.73M2 (ref 60–?)
GLUCOSE BLD-MCNC: 104 MG/DL (ref 70–99)
OSMOLALITY SERPL CALC.SUM OF ELEC: 291 MOSM/KG (ref 275–295)
POTASSIUM SERPL-SCNC: 4.4 MMOL/L (ref 3.5–5.1)
SODIUM SERPL-SCNC: 139 MMOL/L (ref 136–145)

## 2025-03-29 PROCEDURE — 87641 MR-STAPH DNA AMP PROBE: CPT

## 2025-03-29 PROCEDURE — 36415 COLL VENOUS BLD VENIPUNCTURE: CPT

## 2025-03-29 PROCEDURE — 80048 BASIC METABOLIC PNL TOTAL CA: CPT

## 2025-03-30 LAB — MRSA DNA SPEC QL NAA+PROBE: NEGATIVE

## 2025-04-02 ENCOUNTER — APPOINTMENT (OUTPATIENT)
Dept: GENERAL RADIOLOGY | Facility: HOSPITAL | Age: 70
End: 2025-04-02
Attending: ORTHOPAEDIC SURGERY
Payer: MEDICARE

## 2025-04-02 ENCOUNTER — HOSPITAL ENCOUNTER (OUTPATIENT)
Facility: HOSPITAL | Age: 70
Setting detail: HOSPITAL OUTPATIENT SURGERY
Discharge: HOME | End: 2025-04-02
Attending: ORTHOPAEDIC SURGERY | Admitting: ORTHOPAEDIC SURGERY
Payer: MEDICARE

## 2025-04-02 ENCOUNTER — ANESTHESIA EVENT (OUTPATIENT)
Dept: SURGERY | Facility: HOSPITAL | Age: 70
End: 2025-04-02
Payer: MEDICARE

## 2025-04-02 ENCOUNTER — ANESTHESIA (OUTPATIENT)
Dept: SURGERY | Facility: HOSPITAL | Age: 70
End: 2025-04-02
Payer: MEDICARE

## 2025-04-02 VITALS
RESPIRATION RATE: 16 BRPM | WEIGHT: 170 LBS | HEART RATE: 75 BPM | BODY MASS INDEX: 25.18 KG/M2 | TEMPERATURE: 98 F | SYSTOLIC BLOOD PRESSURE: 123 MMHG | OXYGEN SATURATION: 98 % | HEIGHT: 69 IN | DIASTOLIC BLOOD PRESSURE: 65 MMHG

## 2025-04-02 DIAGNOSIS — M19.011 ARTHRITIS OF RIGHT SHOULDER: ICD-10-CM

## 2025-04-02 DIAGNOSIS — Z01.818 PRE-OP TESTING: Primary | ICD-10-CM

## 2025-04-02 PROCEDURE — 88305 TISSUE EXAM BY PATHOLOGIST: CPT | Performed by: ORTHOPAEDIC SURGERY

## 2025-04-02 PROCEDURE — 88311 DECALCIFY TISSUE: CPT | Performed by: ORTHOPAEDIC SURGERY

## 2025-04-02 PROCEDURE — 64415 NJX AA&/STRD BRCH PLXS IMG: CPT | Performed by: ORTHOPAEDIC SURGERY

## 2025-04-02 PROCEDURE — 73020 X-RAY EXAM OF SHOULDER: CPT | Performed by: ORTHOPAEDIC SURGERY

## 2025-04-02 PROCEDURE — 0RRJ0JZ REPLACEMENT OF RIGHT SHOULDER JOINT WITH SYNTHETIC SUBSTITUTE, OPEN APPROACH: ICD-10-PCS | Performed by: ORTHOPAEDIC SURGERY

## 2025-04-02 DEVICE — 3.2MM K-WIRE, TROCAR TIP
Type: IMPLANTABLE DEVICE | Site: SHOULDER
Brand: EQUINOXE

## 2025-04-02 DEVICE — CEMENT BNE 20ML 40GM FULL DOSE PMMA: Type: IMPLANTABLE DEVICE | Site: SHOULDER | Status: FUNCTIONAL

## 2025-04-02 DEVICE — IMPLANTABLE DEVICE: Type: IMPLANTABLE DEVICE | Site: SHOULDER | Status: FUNCTIONAL

## 2025-04-02 DEVICE — IMPLANTABLE DEVICE
Type: IMPLANTABLE DEVICE | Site: SHOULDER | Status: FUNCTIONAL
Brand: EQUINOXE

## 2025-04-02 RX ORDER — VANCOMYCIN HYDROCHLORIDE 1 G/20ML
INJECTION, POWDER, LYOPHILIZED, FOR SOLUTION INTRAVENOUS AS NEEDED
Status: DISCONTINUED | OUTPATIENT
Start: 2025-04-02 | End: 2025-04-02 | Stop reason: HOSPADM

## 2025-04-02 RX ORDER — HYDROMORPHONE HYDROCHLORIDE 1 MG/ML
0.6 INJECTION, SOLUTION INTRAMUSCULAR; INTRAVENOUS; SUBCUTANEOUS EVERY 5 MIN PRN
Status: DISCONTINUED | OUTPATIENT
Start: 2025-04-02 | End: 2025-04-02

## 2025-04-02 RX ORDER — ONDANSETRON 2 MG/ML
4 INJECTION INTRAMUSCULAR; INTRAVENOUS EVERY 6 HOURS PRN
Status: DISCONTINUED | OUTPATIENT
Start: 2025-04-02 | End: 2025-04-02

## 2025-04-02 RX ORDER — ONDANSETRON 2 MG/ML
INJECTION INTRAMUSCULAR; INTRAVENOUS AS NEEDED
Status: DISCONTINUED | OUTPATIENT
Start: 2025-04-02 | End: 2025-04-02 | Stop reason: SURG

## 2025-04-02 RX ORDER — ROPIVACAINE HYDROCHLORIDE 5 MG/ML
INJECTION, SOLUTION EPIDURAL; INFILTRATION; PERINEURAL AS NEEDED
Status: DISCONTINUED | OUTPATIENT
Start: 2025-04-02 | End: 2025-04-02 | Stop reason: SURG

## 2025-04-02 RX ORDER — DEXAMETHASONE SODIUM PHOSPHATE 4 MG/ML
VIAL (ML) INJECTION AS NEEDED
Status: DISCONTINUED | OUTPATIENT
Start: 2025-04-02 | End: 2025-04-02 | Stop reason: SURG

## 2025-04-02 RX ORDER — HYDROMORPHONE HYDROCHLORIDE 1 MG/ML
0.2 INJECTION, SOLUTION INTRAMUSCULAR; INTRAVENOUS; SUBCUTANEOUS EVERY 5 MIN PRN
Status: DISCONTINUED | OUTPATIENT
Start: 2025-04-02 | End: 2025-04-02

## 2025-04-02 RX ORDER — ACETAMINOPHEN 500 MG
1000 TABLET ORAL ONCE
Status: COMPLETED | OUTPATIENT
Start: 2025-04-02 | End: 2025-04-02

## 2025-04-02 RX ORDER — NALOXONE HYDROCHLORIDE 0.4 MG/ML
0.08 INJECTION, SOLUTION INTRAMUSCULAR; INTRAVENOUS; SUBCUTANEOUS AS NEEDED
Status: DISCONTINUED | OUTPATIENT
Start: 2025-04-02 | End: 2025-04-02

## 2025-04-02 RX ORDER — SODIUM CHLORIDE, SODIUM LACTATE, POTASSIUM CHLORIDE, CALCIUM CHLORIDE 600; 310; 30; 20 MG/100ML; MG/100ML; MG/100ML; MG/100ML
INJECTION, SOLUTION INTRAVENOUS CONTINUOUS
Status: DISCONTINUED | OUTPATIENT
Start: 2025-04-02 | End: 2025-04-02

## 2025-04-02 RX ORDER — TRANEXAMIC ACID 10 MG/ML
INJECTION, SOLUTION INTRAVENOUS AS NEEDED
Status: DISCONTINUED | OUTPATIENT
Start: 2025-04-02 | End: 2025-04-02 | Stop reason: SURG

## 2025-04-02 RX ORDER — HYDROCODONE BITARTRATE AND ACETAMINOPHEN 5; 325 MG/1; MG/1
1-2 TABLET ORAL EVERY 6 HOURS PRN
Qty: 30 TABLET | Refills: 0 | Status: SHIPPED | OUTPATIENT
Start: 2025-04-02

## 2025-04-02 RX ORDER — ROCURONIUM BROMIDE 10 MG/ML
INJECTION, SOLUTION INTRAVENOUS AS NEEDED
Status: DISCONTINUED | OUTPATIENT
Start: 2025-04-02 | End: 2025-04-02 | Stop reason: SURG

## 2025-04-02 RX ORDER — LIDOCAINE HYDROCHLORIDE AND EPINEPHRINE 10; 10 MG/ML; UG/ML
INJECTION, SOLUTION INFILTRATION; PERINEURAL AS NEEDED
Status: DISCONTINUED | OUTPATIENT
Start: 2025-04-02 | End: 2025-04-02 | Stop reason: HOSPADM

## 2025-04-02 RX ORDER — PHENYLEPHRINE HCL 10 MG/ML
VIAL (ML) INJECTION AS NEEDED
Status: DISCONTINUED | OUTPATIENT
Start: 2025-04-02 | End: 2025-04-02 | Stop reason: SURG

## 2025-04-02 RX ORDER — LIDOCAINE HYDROCHLORIDE 40 MG/ML
SOLUTION TOPICAL AS NEEDED
Status: DISCONTINUED | OUTPATIENT
Start: 2025-04-02 | End: 2025-04-02

## 2025-04-02 RX ORDER — EPHEDRINE SULFATE 50 MG/ML
INJECTION, SOLUTION INTRAVENOUS AS NEEDED
Status: DISCONTINUED | OUTPATIENT
Start: 2025-04-02 | End: 2025-04-02 | Stop reason: SURG

## 2025-04-02 RX ORDER — MIDAZOLAM HYDROCHLORIDE 1 MG/ML
INJECTION INTRAMUSCULAR; INTRAVENOUS AS NEEDED
Status: DISCONTINUED | OUTPATIENT
Start: 2025-04-02 | End: 2025-04-02 | Stop reason: SURG

## 2025-04-02 RX ORDER — LIDOCAINE HYDROCHLORIDE 10 MG/ML
INJECTION, SOLUTION INFILTRATION; PERINEURAL
Status: COMPLETED | OUTPATIENT
Start: 2025-04-02 | End: 2025-04-02

## 2025-04-02 RX ORDER — LIDOCAINE HYDROCHLORIDE 10 MG/ML
INJECTION, SOLUTION EPIDURAL; INFILTRATION; INTRACAUDAL; PERINEURAL AS NEEDED
Status: DISCONTINUED | OUTPATIENT
Start: 2025-04-02 | End: 2025-04-02 | Stop reason: SURG

## 2025-04-02 RX ORDER — METOCLOPRAMIDE HYDROCHLORIDE 5 MG/ML
10 INJECTION INTRAMUSCULAR; INTRAVENOUS EVERY 8 HOURS PRN
Status: DISCONTINUED | OUTPATIENT
Start: 2025-04-02 | End: 2025-04-02

## 2025-04-02 RX ORDER — HYDROMORPHONE HYDROCHLORIDE 1 MG/ML
0.4 INJECTION, SOLUTION INTRAMUSCULAR; INTRAVENOUS; SUBCUTANEOUS EVERY 5 MIN PRN
Status: DISCONTINUED | OUTPATIENT
Start: 2025-04-02 | End: 2025-04-02

## 2025-04-02 RX ADMIN — MIDAZOLAM HYDROCHLORIDE 2 MG: 1 INJECTION INTRAMUSCULAR; INTRAVENOUS at 07:41:00

## 2025-04-02 RX ADMIN — EPHEDRINE SULFATE 10 MG: 50 INJECTION, SOLUTION INTRAVENOUS at 08:03:00

## 2025-04-02 RX ADMIN — PHENYLEPHRINE HCL 50 MCG: 10 MG/ML VIAL (ML) INJECTION at 08:23:00

## 2025-04-02 RX ADMIN — ROCURONIUM BROMIDE 10 MG: 10 INJECTION, SOLUTION INTRAVENOUS at 08:31:00

## 2025-04-02 RX ADMIN — EPHEDRINE SULFATE 10 MG: 50 INJECTION, SOLUTION INTRAVENOUS at 07:48:00

## 2025-04-02 RX ADMIN — PHENYLEPHRINE HCL 50 MCG: 10 MG/ML VIAL (ML) INJECTION at 07:59:00

## 2025-04-02 RX ADMIN — LIDOCAINE HYDROCHLORIDE 50 MG: 10 INJECTION, SOLUTION EPIDURAL; INFILTRATION; INTRACAUDAL; PERINEURAL at 07:44:00

## 2025-04-02 RX ADMIN — LIDOCAINE HYDROCHLORIDE 2 ML: 10 INJECTION, SOLUTION INFILTRATION; PERINEURAL at 07:19:00

## 2025-04-02 RX ADMIN — ONDANSETRON 4 MG: 2 INJECTION INTRAMUSCULAR; INTRAVENOUS at 09:29:00

## 2025-04-02 RX ADMIN — ROPIVACAINE HYDROCHLORIDE 20 ML: 5 INJECTION, SOLUTION EPIDURAL; INFILTRATION; PERINEURAL at 07:23:00

## 2025-04-02 RX ADMIN — DEXAMETHASONE SODIUM PHOSPHATE 4 MG: 4 MG/ML VIAL (ML) INJECTION at 09:28:00

## 2025-04-02 RX ADMIN — ROCURONIUM BROMIDE 40 MG: 10 INJECTION, SOLUTION INTRAVENOUS at 07:47:00

## 2025-04-02 RX ADMIN — SODIUM CHLORIDE, SODIUM LACTATE, POTASSIUM CHLORIDE, CALCIUM CHLORIDE: 600; 310; 30; 20 INJECTION, SOLUTION INTRAVENOUS at 08:37:00

## 2025-04-02 RX ADMIN — PHENYLEPHRINE HCL 50 MCG: 10 MG/ML VIAL (ML) INJECTION at 08:32:00

## 2025-04-02 RX ADMIN — EPHEDRINE SULFATE 10 MG: 50 INJECTION, SOLUTION INTRAVENOUS at 07:55:00

## 2025-04-02 RX ADMIN — TRANEXAMIC ACID 1000 MG: 10 INJECTION, SOLUTION INTRAVENOUS at 08:00:00

## 2025-04-02 RX ADMIN — ROCURONIUM BROMIDE 10 MG: 10 INJECTION, SOLUTION INTRAVENOUS at 09:19:00

## 2025-04-02 NOTE — ANESTHESIA PROCEDURE NOTES
Airway  Date/Time: 4/2/2025 7:46 AM  Urgency: elective    Difficult airway    General Information and Staff    Patient location during procedure: OR  Resident/CRNA: Nga Ceja CRNA  Performed: CRNA   Performed by: Nga Ceja CRNA  Authorized by: Chris Andres DO      Indications and Patient Condition  Indications for airway management: anesthesia  Spontaneous Ventilation: absent  Sedation level: deep  Preoxygenated: yes  Patient position: sniffing  Mask difficulty assessment: 1 - vent by mask    Final Airway Details  Final airway type: endotracheal airway      Successful airway: ETT  Cuffed: yes   Successful intubation technique: Video laryngoscopy  Endotracheal tube insertion site: oral  Blade: GlideScope  Blade size: #4  ETT size (mm): 7.5    Cormack-Lehane Classification: grade I - full view of glottis  Measured from: lips  ETT to lips (cm): 22  Number of attempts at approach: 1  Ventilation between attempts: BVM  Number of other approaches attempted: 0    Additional Comments  Patient stated difficulty with intubation in the past.  Oral cavity topicalized with 2% lido jelly.  Awake look with glidescope with good visualization of larynx.  Rapid Sequence induction and intubated without difficulty.  Attempt to BVM easy. +ETCO2, BBSE.

## 2025-04-02 NOTE — ANESTHESIA PROCEDURE NOTES
Peripheral Block    Date/Time: 4/2/2025 7:19 AM    Performed by: Chris Andres DO  Authorized by: Chris Andres DO      General Information and Staff    Start Time:  4/2/2025 7:19 AM  End Time:  4/2/2025 7:23 AM  Anesthesiologist:  Chris Andres DO  Performed by:  Anesthesiologist  Patient Location:  PACU    Block Placement: Pre Induction  Site Identification: real time ultrasound guided and image stored and retrievable    Block site/laterality marked before start: site marked  Reason for Block: at surgeon's request and post-op pain management    Preanesthetic Checklist: 2 patient identifers, IV checked, risks and benefits discussed, monitors and equipment checked, pre-op evaluation, timeout performed, anesthesia consent, sterile technique used, no prohibitive neurological deficits and no local skin infection at insertion site      Procedure Details    Patient Position:  Supine  Prep: ChloraPrep    Monitoring:  Heart rate, cardiac monitor, continuous pulse ox and blood pressure cuff  Block Type:  Interscalene  Laterality:  Right  Injection Technique:  Single-shot    Needle    Needle Type:  Short-bevel and echogenic  Needle Gauge:  22 G  Needle Length:  50 mm  Needle Localization:  Ultrasound guidance  Reason for Ultrasound Use: appropriate spread of the medication was noted in real time and no ultrasound evidence of intravascular and/or intraneural injection            Assessment    Injection Assessment:  Good spread noted, incremental injection, low pressure, negative aspiration for heme, negative resistance and local visualized surrounding nerve on ultrasound      Medications  4/2/2025 7:19 AM  lidocaine injection 1% - Intradermal   2 mL - 4/2/2025 7:19:00 AM    Additional Comments    Good image, atraumatic

## 2025-04-02 NOTE — ANESTHESIA PREPROCEDURE EVALUATION
Anesthesia PreOp Note    HPI:     Emil Minor is a 69 year old male who presents for preoperative consultation requested by: Jovani Gutierrez MD    Date of Surgery: 4/2/2025    Procedure(s):  Right anatomic versus reverse total shoulder arthroplasty  Indication: Right shoulder pain, unspecified chronicity    Relevant Problems   No relevant active problems       NPO:  Last Liquid Consumption Date: 04/02/25  Last Liquid Consumption Time: 0400 (SIPS OF WATER WITH MEDS)  Last Solid Consumption Date: 04/01/25  Last Solid Consumption Time: 1600  Last Liquid Consumption Date: 04/02/25          History Review:  Patient Active Problem List    Diagnosis Date Noted    Mixed hyperlipidemia 03/29/2022    Pulmonary hypertension (HCC) 01/31/2022    Aortic atherosclerosis 01/31/2022    Rheumatoid arthritis (HCC)     CHRIS (obstructive sleep apnea) 01/04/2022    Rheumatoid arthritis, involving unspecified site, unspecified whether rheumatoid factor present (HCC) 08/25/2021    Pulmonary embolus (HCC) 08/14/2021    Other acute pulmonary embolism without acute cor pulmonale (HCC) 08/14/2021    Pulmonary infarct (HCC) 08/14/2021    Personal history of peptic ulcer disease 07/20/2021    DISH (diffuse idiopathic skeletal hyperostosis)     Pancreatitis (HCC)     Right rotator cuff tendonitis 12/07/2020    Trigger finger, left middle finger 12/07/2020    Severe recurrent major depression (HCC) 11/12/2020    MDD (major depressive disorder), recurrent episode, severe (HCC) 11/06/2020    Trigger finger of right thumb 10/24/2019    S/P reverse total shoulder arthroplasty, left 08/19/2019    Other insomnia 07/30/2019    HTN (hypertension) 07/29/2019    Meniere's disease 07/29/2019    Depression 07/29/2019    Moderate episode of recurrent major depressive disorder (HCC) 02/08/2019    Chronic pain of both shoulders 02/08/2019    Benign prostatic hyperplasia without lower urinary tract symptoms 09/07/2007    Personal history of colonic polyps  09/07/2007    Impotence of organic origin 09/07/2007    Family history of diabetes mellitus 09/07/2007       Past Medical History:    BPH (benign prostatic hyperplasia)    Bronchitis    CAD (coronary artery disease)    nonobstructive    Calculus of kidney    Cancer (HCC)    prostate    Cataract    Coronary atherosclerosis    Depression    Difficult intubation    possible difficult intubation due to DISH    DISH (diffuse idiopathic skeletal hyperostosis)    Diverticulosis of large intestine    Erectile dysfunction    Exposure to medical diagnostic radiation    Hearing impairment    both ears, left side feels clogged up, hx of tinnitus and Menierre's    High blood pressure    High cholesterol    Meniere's disease    CHRIS (obstructive sleep apnea)    AHI-19    CHRIS (obstructive sleep apnea)    Osteoarthritis    b/l shoulders    Pancreatitis (HCC)    Pneumonia due to organism    Pulmonary embolism (HCC)    Raynaud's disease    Rheumatoid arthritis (HCC)    Sleep apnea    CPAP    Spontaneous pneumothorax    age 20's and again age 30's, treated with chest tube on first episode    Visual impairment    glasses       Past Surgical History:   Procedure Laterality Date    Appendectomy      Arthroscopy of joint unlisted      knee    Colonoscopy  07/2018    Colonoscopy      Knee replacement surgery      bilat 2017    Knee scope,med/lat menisectomy  09/27/2007    Performed by LENIN HORNER at [facility] [place], [facility]    Other surgical history      spontaneous pneumo and chest tube     Other surgical history      sinus surgery    Other surgical history      L shoulder replacement    Other surgical history      L 1st toe fusion    Reconstr total shoulder implant Left 07/29/2019    Dr Gutierrez       Prescriptions Prior to Admission[1]  Current Medications and Prescriptions Ordered in Epic[2]    Allergies[3]    Family History   Problem Relation Age of Onset    Diabetes Father     Heart Disorder Father     Other (Other) Father         hip  fracture and pneumonia      Cancer Mother         ovarian liver    Dementia Mother     Depression Sister     Cancer Sister         lung    Other (RA) Sister     Cancer Brother         liver     Social History     Socioeconomic History    Marital status:    Tobacco Use    Smoking status: Former     Current packs/day: 0.00     Average packs/day: 3.5 packs/day for 28.0 years (98.0 ttl pk-yrs)     Types: Cigarettes     Start date: 1967     Quit date:      Years since quittin.2    Smokeless tobacco: Never    Tobacco comments:     Quit    Vaping Use    Vaping status: Never Used   Substance and Sexual Activity    Alcohol use: Not Currently    Drug use: Not Currently     Types: Cannabis, Opiods, LSD     Comment: gummie for sleep occasionally. Last smoked 2020.  LSD as a teenager       Available pre-op labs reviewed.     Lab Results   Component Value Date     2025    K 4.4 2025     2025    CO2 30.0 2025    BUN 20 2025    CREATSERUM 0.82 2025     (H) 2025    CA 9.0 2025          Vital Signs:  Body mass index is 25.1 kg/m².   height is 1.753 m (5' 9\") and weight is 77.1 kg (170 lb). His oral temperature is 98.1 °F (36.7 °C). His blood pressure is 106/80 and his pulse is 65. His respiration is 13 and oxygen saturation is 99%.   Vitals:    25 1545 25 0601 25 0648   BP:  104/62 106/80   Pulse:  98 65   Resp:  16 13   Temp:  98.1 °F (36.7 °C)    TempSrc:  Oral    SpO2:  100% 99%   Weight: 76.2 kg (168 lb) 77.1 kg (170 lb)    Height: 1.715 m (5' 7.5\") 1.753 m (5' 9\")         Anesthesia Evaluation      Airway   Mallampati: III  Neck ROM: limited  Dental          Pulmonary - normal exam   (+) sleep apnea  Cardiovascular - normal exam  (+) hypertension, CAD    ROS comment: Stress Test :  Impressions:     - Normal nuclear perfusion study.   - There is no evidence of myocardial ischemia.   - There is no evidence of  myocardial infarction.   - Normal regional wall thickening is noted on gated SPECT analysis.   - Normal ejection fraction.       Neuro/Psych    (+)   depression      GI/Hepatic/Renal      Endo/Other    (+) arthritis    Comments: DISH disease  Abdominal   (-) obese                 Anesthesia Plan:   ASA:  3  Plan:   General  Airway:  ETT and Video laryngoscope  Plan Comments: Possible fiberoptic  Informed Consent Plan and Risks Discussed With:  Patient  Consent Comment: I have discussed the anesthetic plan, major risks and alternatives with the patient and answered all questions.  Minor and major side effects were discussed with patient, including but not limited to: injury to teeth/gums/lips, aspiration, nausea/vomiting postoperatively, anaphylaxis, heart attack, stroke, post-operative ventilation and death. The patient desires to proceed with surgery and anesthesia as planned. All questions answered.    Discussed plan with:  CRNA      I have informed Emil Minor and/or legal guardian or family member of the nature of the anesthetic plan, benefits, risks including possible dental damage if relevant, major complications, and any alternative forms of anesthetic management.   All of the patient's questions were answered to the best of my ability. The patient desires the anesthetic management as planned.  Chris Andres DO  4/2/2025 6:55 AM  Present on Admission:  **None**           [1]   Medications Prior to Admission   Medication Sig Dispense Refill Last Dose/Taking    Econazole Nitrate 1 % External Cream Apply 1 Application topically 2 (two) times daily.   Taking    BUPROPION  MG Oral Tablet 24 Hr TAKE 1 TABLET BY MOUTH IN THE MORNING 90 tablet 0 4/1/2025 at  8:00 PM    BUPROPION  MG Oral Tablet 24 Hr TAKE 1 TABLET BY MOUTH ONCE DAILY (Patient taking differently: Take 1 tablet (300 mg total) by mouth daily. Takes a total of 450 mg daily) 90 tablet 0 4/1/2025 at  8:00 PM    busPIRone 10 MG Oral Tab TAKE 2  TABLETS BY MOUTH 3 TIMES DAILY 180 tablet 0 4/1/2025 at  8:00 PM    ARIPiprazole 2 MG Oral Tab Take 1 tablet (2 mg total) by mouth daily. 90 tablet 0 4/2/2025 at  4:00 AM    escitalopram 20 MG Oral Tab TAKE ONE AND HALF TABLETS BY MOUTH DAILY 135 tablet 0 4/1/2025 at  8:00 PM    TROSPIUM CHLORIDE OR Take 20 mg by mouth in the morning and 20 mg before bedtime.   4/2/2025 at  4:00 AM    Pancrelipase, Lip-Prot-Amyl, (CREON OR) Take by mouth. As directed   Past Month    ETANERCEPT SC    3/24/2025    atorvastatin 20 MG Oral Tab Take 1 tablet (20 mg total) by mouth daily. 90 tablet 3 4/1/2025 at  8:00 PM    tamsulosin (FLOMAX) cap TAKE 1 CAPSULE BY MOUTH EVERY DAY 90 capsule 0 4/1/2025 at  8:00 PM    METHOTREXATE 2.5 MG Oral Tab TAKE 8 TABLETS BY MOUTH ONCE A WEEK. 96 tablet 0 3/13/2025    rivaroxaban 10 MG Oral Tab Take 1 tablet (10 mg total) by mouth daily with food. 90 tablet 1 3/24/2025    gabapentin 300 MG Oral Cap Take 1 capsule (300 mg total) by mouth 3 (three) times daily. 90 capsule 1 4/1/2025 at  8:00 PM    acetaminophen 325 MG Oral Tab Take 2 tablets (650 mg total) by mouth every 6 (six) hours as needed for Pain.  0 Past Week    folic acid 1 MG Oral Tab Take 1 tablet (1 mg total) by mouth daily. 90 tablet 3 4/2/2025 at  4:00 AM    Albuterol Sulfate  (90 Base) MCG/ACT Inhalation Aero Soln Inhale 2 puffs into the lungs every 6 (six) hours as needed for Wheezing. 3 Inhaler 3 More than a month    Meclizine HCl (ANTIVERT) 25 MG Oral Tab Take 1 tablet (25 mg total) by mouth 3 (three) times daily as needed. 270 tablet 1 More than a month   [2]   Current Facility-Administered Medications Ordered in Epic   Medication Dose Route Frequency Provider Last Rate Last Admin    lactated ringers infusion   Intravenous Continuous Brenda, MD Jovani 20 mL/hr at 04/02/25 0611 New Bag at 04/02/25 0611    ceFAZolin (Ancef) 2g in 10mL IV syringe premix  2 g Intravenous Once Brenda, MD Jovani         No current Epic-ordered outpatient  medications on file.   [3]   Allergies  Allergen Reactions    Diclofenac OTHER (SEE COMMENTS) and NAUSEA ONLY     Upset stomach. Diarrhea.

## 2025-04-02 NOTE — DISCHARGE INSTRUCTIONS
Total Shoulder Arthroplasty Post-operative Instructions    Goals:  1. Protect the shoulder   2. Encourage wound healing  3. Prevent shoulder stiffness    - Operative Dressings should remain for 3 days, then may be removed and covered with clean dressing   - Keep incision clean and dry (no soaking for 2 weeks)    - Sling should be worn at ALL times for 4 weeks (except for therapy)   - if sitting, you may remove the sling and keep the arm tucked into your side   - may perform gentle pendulum exercises   - While lying on your back, a small pillow or towel roll should be placed behind  your elbow to avoid excessive motion   - You shoulder always be able to SEE your elbow    - Avoid actively using the shoulder unless directed by therapy  - No lifting of objects  - No reaching behind your back  - No excessive stretching or sudden movements (particularly external rotation)   - No external rotation beyond 30 ?  for the first 4 weeks.  - No supporting body weight with arm  - No driving for 3 weeks and if taking Narcotic pain medications        HOME INSTRUCTIONS  [facility] HOME CARE INSTRUCTIONS: POST-OP ANESTHESIA  The medication that you received for sedation or general anesthesia can last up to 24 hours. Your judgment and reflexes may be altered, even if you feel like your normal self.      We Recommend:   Do not drive any motor vehicle or bicycle   Avoid mowing the lawn, playing sports, or working with power tools/applicances (power saws, electric knives or mixers)   That you have someone stay with you on your first night home   Do not drink alcohol or take sleeping pills or tranquilizers   Do not sign legal documents within 24 hours of your procedure   If you had a nerve block for your surgery, take extra care not to put any pressure on your arm or hand for 24 hours    It is normal:  For you to have a sore throat if you had a breathing tube during surgery (while you were asleep!). The sore throat should get better within  48 hours. You can gargle with warm salt water (1/2 tsp in 4 oz warm water) or use a throat lozenge for comfort  To feel muscle aches or soreness especially in the abdomen, chest or neck. The achy feeling should go away in the next 24 hours  To feel weak, sleepy or \"wiped out\". Your should start feeling better in the next 24 hours.   To experience mild discomforts such as sore lip or tongue, headache, cramps, gas pains or a bloated feeling in your abdomen.   To experience mild back pain or soreness for a day or two if you had spinal or epidural anesthesia.   If you had laparoscopic surgery, to feel shoulder pain or discomfort on the day of surgery.   For some patients to have nausea after surgery/anesthesia    If you feel nausea or experience vomiting:   Try to move around less.   Eat less than usual or drink only liquids until the next morning   Nausea should resolve in about 24 hours    If you have a problem when you are at home:    Call your surgeons office       Discharge Instructions: After Your Surgery  You’ve just had surgery. During surgery, you were given medicine called anesthesia to keep you relaxed and free of pain. After surgery, you may have some pain or nausea. This is common. Here are some tips for feeling better and getting well after surgery.   Going home  Your healthcare provider will show you how to take care of yourself when you go home. They'll also answer your questions. Have an adult family member or friend drive you home. For the first 24 hours after your surgery:   Don't drive or use heavy equipment.  Don't make important decisions or sign legal papers.  Take medicines as directed.  Don't drink alcohol.  Have someone stay with you, if needed. They can watch for problems and help keep you safe.  Be sure to go to all follow-up visits with your healthcare provider. And rest after your surgery for as long as your provider tells you to.   Coping with pain  If you have pain after surgery, pain  medicine will help you feel better. Take it as directed, before pain becomes severe. Also, ask your healthcare provider or pharmacist about other ways to control pain. This might be with heat, ice, or relaxation. And follow any other instructions your surgeon or nurse gives you.      Stay on schedule with your medicine.     Tips for taking pain medicine  To get the best relief possible, remember these points:   Pain medicines can upset your stomach. Taking them with a little food may help.  Most pain relievers taken by mouth need at least 20 to 30 minutes to start to work.  Don't wait till your pain becomes severe before you take your medicine. Try to time your medicine so that you can take it before starting an activity. This might be before you get dressed, go for a walk, or sit down for dinner.  Constipation is a common side effect of some pain medicines. Call your healthcare provider before taking any medicines such as laxatives or stool softeners to help ease constipation. Also ask if you should skip any foods. Drinking lots of fluids and eating foods such as fruits and vegetables that are high in fiber can also help. Remember, don't take laxatives unless your surgeon has prescribed them.  Drinking alcohol and taking pain medicine can cause dizziness and slow your breathing. It can even be deadly. Don't drink alcohol while taking pain medicine.  Pain medicine can make you react more slowly to things. Don't drive or run machinery while taking pain medicine.  Your healthcare provider may tell you to take acetaminophen to help ease your pain. Ask them how much you're supposed to take each day. Acetaminophen or other pain relievers may interact with your prescription medicines or other over-the-counter (OTC) medicines. Some prescription medicines have acetaminophen and other ingredients in them. Using both prescription and OTC acetaminophen for pain can cause you to accidentally overdose. Read the labels on your  OTC medicines with care. This will help you to clearly know the list of ingredients, how much to take, and any warnings. It may also help you not take too much acetaminophen. If you have questions or don't understand the information, ask your pharmacist or healthcare provider to explain it to you before you take the OTC medicine.   Managing nausea  Some people have an upset stomach (nausea) after surgery. This is often because of anesthesia, pain, or pain medicine, less movement of food in the stomach, or the stress of surgery. These tips will help you handle nausea and eat healthy foods as you get better. If you were on a special food plan before surgery, ask your healthcare provider if you should follow it while you get better. Check with your provider on how your eating should progress. It may depend on the surgery you had. These general tips may help:   Don't push yourself to eat. Your body will tell you when to eat and how much.  Start off with clear liquids and soup. They're easier to digest.  Next try semi-solid foods as you feel ready. These include mashed potatoes, applesauce, and gelatin.  Slowly move to solid foods. Don’t eat fatty, rich, or spicy foods at first.  Don't force yourself to have 3 large meals a day. Instead eat smaller amounts more often.  Take pain medicines with a small amount of solid food, such as crackers or toast. This helps prevent nausea.  When to call your healthcare provider  Call your healthcare provider right away if you have any of these:   You still have too much pain, or the pain gets worse, after taking the medicine. The medicine may not be strong enough. Or there may be a complication from the surgery.  You feel too sleepy, dizzy, or groggy. The medicine may be too strong.  Side effects such as nausea or vomiting. Your healthcare provider may advise taking other medicines to .  Skin changes such as rash, itching, or hives. This may mean you have an allergic reaction. Your  provider may advise taking other medicines.  The incision looks different (for instance, part of it opens up).  Bleeding or fluid leaking from the incision site, and weren't told to expect that.  Fever of 100.4°F (38°C) or higher, or as directed by your provider.  Call 911  Call 911 right away if you have:   Trouble breathing  Facial swelling    If you have obstructive sleep apnea   You were given anesthesia medicine during surgery to keep you comfortable and free of pain. After surgery, you may have more apnea spells because of this medicine and other medicines you were given. The spells may last longer than normal.    At home:  Keep using the continuous positive airway pressure (CPAP) device when you sleep. Unless your healthcare provider tells you not to, use it when you sleep, day or night. CPAP is a common device used to treat obstructive sleep apnea.  Talk with your provider before taking any pain medicine, muscle relaxants, or sedatives. Your provider will tell you about the possible dangers of taking these medicines.  Contact your provider if your sleeping changes a lot even when taking medicines as directed.  Jaye last reviewed this educational content on 10/1/2021  © 7880-0262 [employer]. All rights reserved. This information is not intended as a substitute for professional medical care. Always follow your healthcare professional's instructions.

## 2025-04-02 NOTE — OPERATIVE REPORT
Operative Note    Patient: Emil Minor  MRN: [id number]     YOB: 1955   Age: 69 year old   Sex: male     Date of Procedure: 4/2/2025   Preoperative Diagnosis:  Right shoulder glenohumeral arthritis, biceps tendinosis  Postoperative Diagnosis: same   Procedure:   Right anatomic total shoulder arthroplasty  Right proximal biceps tenodesis    Anesthesia: General     Skilled assistant was Physician Assistant Skyler Maldonado needed for patient positioning, prepping and draping, instrument holding and passing, retracting, and suturing      Procedure: The patient was taken to the operating room and placed in the supine position, a timeout was performed prior to any intervention, verifying the correct operative site, antibiotics, and the equipment necessary was present.  Anesthesia was administered and then the patient placed in the beachchair position. All bony prominences were well padded.  The extremitywas then prepped and draped in the usual sterile fashion.      A longitudinal skin incision was made from the coracoid  extending distally over the lateral aspect of the arm. This was taken down through the skin and subcutaneous tissue.     The interval between the deltoid and the pectoralis musculature was defined, the cephalic vein was identified and protected throughout its course and retracted, the subacromial space was then developed. The clavipectoral fascia was then identified and incised longitudinally, retractors were placed, with caution to minimize retraction on the conjoined tendon.  The circumflex vessels were then identified, and ligated.  The axillary nerve was palpated and protected throughout the case.     The biceps tendon was identified in the groove and tagged, then tenodesis preformed to the superior aspect of the pectoralis tendon    The subscapularis tendon was then identified, this was tagged with stay sutures, the rotator interval was then pierced superiorly, and the subscapularis  elevated off of the lesser tuberosity. Supraspinatus tendon was left intact and noted to be competent.    Next, the humeral head was identified and the capsule elevated circumferentially off the humerus, Allowing complete external rotation and dislocation of the humerus.      the humeral head was inspected and noted to be significantly arthritic with arthritis of the glenoid as well, with significant eburnation,     Using an extra medullary guide, the neck cut was marked, and performed with caution to protect the rotator cuff superiorly.  Next the intramedullary broach was placed, with 30° retroversion,  sequential reaming up to fit.  After final reaming,a protector was placed and we turned to address the glenoid, retractors were placed to retract the humeral head to allow complete exposure of the glenoid. The glenoid labrum was incised sharply and excised.  Osteophytes were removed with the aid of a rongeur, no significant posterior wear    Next, using a Bovie cautery, the center point of the glenoid was identified, the glenoid templated,and a K wire placed, the properly sized reamer was then placed and reaming performed, based on preoperative x-rays, to subchondral bone, .  Again, the circumference of the glenoid was cleaned of any osteophytes, templated baseplate was then placed and central and peripheral holes were drilled.  The wound was then copiously irrigated, and the cement prepared, cement was placed into the peripheral holes only and the final component placed and held until the cement had hardened.    Carefully, retractors were removed and we returned our attention to thehumerus, a trial component was placed with an appropriate sized head, this was trialed in the reduced position, with adequate noted motion, 50% posterior translation and adequate internal/external rotation a full forward elevation.  The canal was then copiously irrigated. Drill hole was then placed in the tuberosity for later fixation of  the subscapularis.    The final component was then placed, with adequate fixation. The joint was then reduced, copiously irrigated once more, trialed again with significant motion and posterior translation of 50% and brisk bounce back.     The subscapularis was then repaired while in internal rotation back to previously placed drill holes. The rotator interval was reapproximated.     The wound was then copiously irrigated with normal saline, hemostasis was obtained.    The deep fascia was reapproximated with Vicryl sutures, Subcutaneous tissues were then closed with 3-0 Vicryl sutures followed by Monocryl in the skin. Sterile dressings were applied, and a sling placed      The patient Awakened from anesthesia without complication and was transferred to the recovery room in stable condition.    .aiopi  Estimated Blood Loss:   Specimens (From admission, onward)      None          Findings: As noted above.  Complications: none  Implants:   Implant Name Type Inv. Item Serial No.  Lot No. LRB No. Used Action   CEMENT BNE 20ML 40GM FULL DOSE PMMA - SN/A  CEMENT BNE 20ML 40GM FULL DOSE PMMA N/A [employer]  [id number] Implanted   COMPONENT TAJ M 0 DEG B UHMWPE TI LSR 3D - [id number]  COMPONENT TAJ M 0 DEG B UHMWPE TI LSR 3D [id number] [employer]  N/A Right 1 Implanted   HEAD HUM 53MM SHT SHLDR EQUINOXE - [id number]  HEAD HUM 53MM SHT SHLDR EQUINOXE [id number] [employer]  NA Right 1 Implanted   STEM HUM 8MM PRESSFIT PRESERVE EQUINOXE - [id number]  STEM HUM 8MM PRESSFIT PRESERVE EQUINOXE [id number] [employer]  NA Right 1 Implanted   PLATE BNE 1.5MM JAMES REPLICATOR EQUINOXE - [id number]  PLATE BNE 1.5MM JAMES REPLICATOR EQUINOXE [id number] [employer]  NA Right 1 Implanted   EQUINOXE TORQUE DEFINING SQUARE DRIVE SCREW KIT   [id number]  [employer] NA Right 1 Implanted     Postoperative disposition: The patient was taken  to the recovery unit in good condition.  He will be discharged, with the plan to remain nonweightbearing to the Right upper extremity and follow-up in 2 weeks for a wound check and suture removal.

## 2025-04-02 NOTE — H&P
History & Physical Examination    Patient Name: Emil Minor  MRN: [id number]  CSN: [id number]  YOB: 1955    Diagnosis: Right shoulder arthritis    Present Illness: Patient is a 70 yo male with h/o right shoulder pain and limited ROM.  Patient denies any radiating pain or paresthesias.      Prescriptions Prior to Admission[1]  Current Facility-Administered Medications   Medication Dose Route Frequency    lactated ringers infusion   Intravenous Continuous    ceFAZolin (Ancef) 2g in 10mL IV syringe premix  2 g Intravenous Once       Allergies: Allergies[2]    Past Medical History:    BPH (benign prostatic hyperplasia)    Bronchitis    CAD (coronary artery disease)    nonobstructive    Calculus of kidney    Cancer (HCC)    prostate    Cataract    Coronary atherosclerosis    Depression    Difficult intubation    possible difficult intubation due to DISH    DISH (diffuse idiopathic skeletal hyperostosis)    Diverticulosis of large intestine    Erectile dysfunction    Exposure to medical diagnostic radiation    Hearing impairment    both ears, left side feels clogged up, hx of tinnitus and Menierre's    High blood pressure    High cholesterol    Meniere's disease    CHRIS (obstructive sleep apnea)    AHI-19    CHRIS (obstructive sleep apnea)    Osteoarthritis    b/l shoulders    Pancreatitis (HCC)    Pneumonia due to organism    Pulmonary embolism (HCC)    Raynaud's disease    Rheumatoid arthritis (HCC)    Sleep apnea    CPAP    Spontaneous pneumothorax    age 20's and again age 30's, treated with chest tube on first episode    Visual impairment    glasses     Past Surgical History:   Procedure Laterality Date    Appendectomy      Arthroscopy of joint unlisted      knee    Colonoscopy  07/2018    Colonoscopy      Knee replacement surgery      bilat 2017    Knee scope,med/lat menisectomy  09/27/2007    Performed by LENIN HORNER at [facility] [place], [facility]    Other surgical history      spontaneous pneumo and  chest tube     Other surgical history      sinus surgery    Other surgical history      L shoulder replacement    Other surgical history      L 1st toe fusion    Reconstr total shoulder implant Left 2019    Dr Gutierrez     Family History   Problem Relation Age of Onset    Diabetes Father     Heart Disorder Father     Other (Other) Father         hip fracture and pneumonia      Cancer Mother         ovarian liver    Dementia Mother     Depression Sister     Cancer Sister         lung    Other (RA) Sister     Cancer Brother         liver     Social History     Tobacco Use    Smoking status: Former     Current packs/day: 0.00     Average packs/day: 3.5 packs/day for 28.0 years (98.0 ttl pk-yrs)     Types: Cigarettes     Start date: 1967     Quit date:      Years since quittin.2    Smokeless tobacco: Never    Tobacco comments:     Quit    Substance Use Topics    Alcohol use: Not Currently       System Review is Normal Exam is Normal If not normal, explain   HEENT yes yes    NECK & BACK yes yes    HEART yes yes    LUNGS yes yes    ABDOMEN yes n/a    UROGENITAL yes n/a    EXTREMITIES yes no Per HPI   MENTAL STATUS yes yes      [ x ] I have discussed the risks and benefits and alternatives with the patient/family.  They understand and agree to proceed with plan of care.  [ x ] I have reviewed the History and Physical done within the last 30 days.  Any changes noted above.    Skyler Maldonado PA-C  2025  7:24 AM          [1]   Medications Prior to Admission   Medication Sig Dispense Refill Last Dose/Taking    Econazole Nitrate 1 % External Cream Apply 1 Application topically 2 (two) times daily.   Taking    BUPROPION  MG Oral Tablet 24 Hr TAKE 1 TABLET BY MOUTH IN THE MORNING 90 tablet 0 2025 at  8:00 PM    BUPROPION  MG Oral Tablet 24 Hr TAKE 1 TABLET BY MOUTH ONCE DAILY (Patient taking differently: Take 1 tablet (300 mg total) by mouth daily. Takes a total of 450 mg daily) 90 tablet 0  4/1/2025 at  8:00 PM    busPIRone 10 MG Oral Tab TAKE 2 TABLETS BY MOUTH 3 TIMES DAILY 180 tablet 0 4/1/2025 at  8:00 PM    ARIPiprazole 2 MG Oral Tab Take 1 tablet (2 mg total) by mouth daily. 90 tablet 0 4/2/2025 at  4:00 AM    escitalopram 20 MG Oral Tab TAKE ONE AND HALF TABLETS BY MOUTH DAILY 135 tablet 0 4/1/2025 at  8:00 PM    TROSPIUM CHLORIDE OR Take 20 mg by mouth in the morning and 20 mg before bedtime.   4/2/2025 at  4:00 AM    Pancrelipase, Lip-Prot-Amyl, (CREON OR) Take by mouth. As directed   Past Month    ETANERCEPT SC    3/24/2025    atorvastatin 20 MG Oral Tab Take 1 tablet (20 mg total) by mouth daily. 90 tablet 3 4/1/2025 at  8:00 PM    tamsulosin (FLOMAX) cap TAKE 1 CAPSULE BY MOUTH EVERY DAY 90 capsule 0 4/1/2025 at  8:00 PM    METHOTREXATE 2.5 MG Oral Tab TAKE 8 TABLETS BY MOUTH ONCE A WEEK. 96 tablet 0 3/13/2025    rivaroxaban 10 MG Oral Tab Take 1 tablet (10 mg total) by mouth daily with food. 90 tablet 1 3/24/2025    gabapentin 300 MG Oral Cap Take 1 capsule (300 mg total) by mouth 3 (three) times daily. 90 capsule 1 4/1/2025 at  8:00 PM    acetaminophen 325 MG Oral Tab Take 2 tablets (650 mg total) by mouth every 6 (six) hours as needed for Pain.  0 Past Week    folic acid 1 MG Oral Tab Take 1 tablet (1 mg total) by mouth daily. 90 tablet 3 4/2/2025 at  4:00 AM    Albuterol Sulfate  (90 Base) MCG/ACT Inhalation Aero Soln Inhale 2 puffs into the lungs every 6 (six) hours as needed for Wheezing. 3 Inhaler 3 More than a month    Meclizine HCl (ANTIVERT) 25 MG Oral Tab Take 1 tablet (25 mg total) by mouth 3 (three) times daily as needed. 270 tablet 1 More than a month   [2]   Allergies  Allergen Reactions    Diclofenac OTHER (SEE COMMENTS) and NAUSEA ONLY     Upset stomach. Diarrhea.

## 2025-04-02 NOTE — ANESTHESIA POSTPROCEDURE EVALUATION
Patient: Emil Minor    Procedure Summary       Date: 04/02/25 Room / Location: [facility] MAIN OR 04 / [facility] MAIN OR    Anesthesia Start: 0738 Anesthesia Stop: 1011    Procedure: Right anatomic total shoulder arthroplasty (Right: Shoulder) Diagnosis: (Right shoulder pain, unspecified chronicity)    Surgeons: Jovani Gutierrez MD Anesthesiologist: Chris Andres DO    Anesthesia Type: general ASA Status: 3            Anesthesia Type: general    Vitals Value Taken Time   /75 04/02/25 1005   Temp 97.5 °F (36.4 °C) 04/02/25 1005   Pulse 80 04/02/25 1010   Resp 14 04/02/25 1010   SpO2 98 % 04/02/25 1010   Vitals shown include unfiled device data.    [facility] AN Post Evaluation:   Patient Evaluated in PACU  Patient Participation: complete - patient participated  Level of Consciousness: awake  Pain Score: 0  Pain Management: adequate  Airway Patency:patent  Yes    Nausea/Vomiting: none  Cardiovascular Status: acceptable and blood pressure returned to baseline  Respiratory Status: acceptable and nasal cannula  Postoperative Hydration acceptable      Nga Ceja, CRNA  4/2/2025 10:11 AM

## (undated) DEVICE — DRAPE SRG 90X60IN BCK TBL CVR

## (undated) DEVICE — SUTURE VICRYL 0 J906G

## (undated) DEVICE — SOL  .9 1000ML BTL

## (undated) DEVICE — SPONGE LAP 18X18 XRAY STRL

## (undated) DEVICE — TOWEL OR BLU 16X26 STRL

## (undated) DEVICE — SOL  .9 3000ML

## (undated) DEVICE — FAN SPRAY KIT: Brand: PULSAVAC®

## (undated) DEVICE — COVER,MAYO STAND,STERILE: Brand: MEDLINE

## (undated) DEVICE — GLOVE SUR 8.5 SENSICARE PI PIP GRN PWD F

## (undated) DEVICE — ELECTRODE ESURG 2.75IN EZ CLN

## (undated) DEVICE — PROXIMATE SKIN STAPLERS (35 WIDE) CONTAINS 35 STAINLESS STEEL STAPLES (FIXED HEAD): Brand: PROXIMATE

## (undated) DEVICE — SHOULDER P.A.D II: Brand: DEROYAL

## (undated) DEVICE — BEACH CHAIR MASK SGL USE

## (undated) DEVICE — SLING ARM L L20IN D7IN DK BLU COT POLY WIDE

## (undated) DEVICE — Device: Brand: STABLECUT®

## (undated) DEVICE — DRESSING SUR 9X25CM SIL SP CVR WTRPRF VIR

## (undated) DEVICE — SHEET,DRAPE,53X77,STERILE: Brand: MEDLINE

## (undated) DEVICE — 3M™ STERI-STRIP™ REINFORCED ADHESIVE SKIN CLOSURES, R1547, 1/2 IN X 4 IN (12 MM X 100 MM), 6 STRIPS/ENVELOPE: Brand: 3M™ STERI-STRIP™

## (undated) DEVICE — ADHESIVE SKIN TOP FOR WND CLSR DERMBND ADV

## (undated) DEVICE — GUIDE WIRE

## (undated) DEVICE — ANTIBACTERIAL UNDYED BRAIDED (POLYGLACTIN 910), SYNTHETIC ABSORBABLE SUTURE: Brand: COATED VICRYL

## (undated) DEVICE — BOWL CEMENT MIX QUICK-VAC

## (undated) DEVICE — SPONGE LAP 18X18IN WHT COT 4 PLY FLD STRUNG

## (undated) DEVICE — HANDPIECE SET WITH HIGH FLOW TIP AND SUCTION TUBE: Brand: INTERPULSE

## (undated) DEVICE — SYRINGE,TOOMEY,IRRIGATION,70CC,STERILE: Brand: MEDLINE

## (undated) DEVICE — GAMMEX® PI HYBRID SIZE 8.5, STERILE POWDER-FREE SURGICAL GLOVE, POLYISOPRENE AND NEOPRENE BLEND: Brand: GAMMEX

## (undated) DEVICE — STANDARD HYPODERMIC NEEDLE,POLYPROPYLENE HUB: Brand: MONOJECT

## (undated) DEVICE — SUTURE VICRYL 2-0 FS-1

## (undated) DEVICE — PLASTC TOOMEY SYRNG DISP

## (undated) DEVICE — SHOULDER: Brand: MEDLINE INDUSTRIES, INC.

## (undated) DEVICE — T-MAX DISPOSABLE FACE MASK 8 PER BOX

## (undated) DEVICE — Device

## (undated) DEVICE — SUTURE ETHIBOND 2 V-37

## (undated) DEVICE — SUT ETHBND XL 2 30IN V-37 NABSRB GRN 40MM 1/2

## (undated) DEVICE — 3M™ STERI-DRAPE™ U-DRAPE 1015: Brand: STERI-DRAPE™

## (undated) DEVICE — SUTURE VICRYL 3-0 SH

## (undated) DEVICE — BATTERY

## (undated) DEVICE — INSULATED NEEDLE ELECTRODE: Brand: EDGE

## (undated) DEVICE — DRAPE,U/ SHT,SPLIT,PLAS,STERIL: Brand: MEDLINE

## (undated) DEVICE — BANDAGE,GAUZE,BULKEE II,4.5"X4.1YD,STRL: Brand: MEDLINE

## (undated) DEVICE — 3M™ IOBAN™ 2 ANTIMICROBIAL INCISE DRAPE 6650EZ: Brand: IOBAN™ 2

## (undated) DEVICE — 3 BONE CEMENT MIXER: Brand: MIXEVAC

## (undated) DEVICE — GLOVE SUR 8 SENSICARE PI MIC PIP CRM PWD F

## (undated) DEVICE — SOLUTION IRRIG 3000ML 0.9% NACL FLX CONT

## (undated) DEVICE — SUTURE VICRYL 0 CP-1

## (undated) DEVICE — COVER,TABLE,60X90,STERILE: Brand: MEDLINE

## (undated) DEVICE — DRAPE SRG 70X60IN SPLT U IMPRV

## (undated) DEVICE — APPLICATOR PREP 26ML CHG 2% ISO ALC 70%

## (undated) DEVICE — CHLORAPREP 26ML APPLICATOR

## (undated) DEVICE — SOLUTION IRRIG 1000ML 0.9% NACL USP BTL

## (undated) DEVICE — PACK CDS SHOULDER

## (undated) DEVICE — GAMMEX® PI HYBRID SIZE 8, STERILE POWDER-FREE SURGICAL GLOVE, POLYISOPRENE AND NEOPRENE BLEND: Brand: GAMMEX

## (undated) DEVICE — SUT MCRYL 4-0 18IN PC-3 ABSRB UD L16MM 1/2 CI

## (undated) DEVICE — DRAPE SHEET LG

## (undated) DEVICE — CONTAINER SPEC STR 4OZ GRY LID

## (undated) DEVICE — SYRINGE MED 20ML STD CLR PLAS LL TIP N CTRL

## (undated) DEVICE — TOWEL,OR,DSP,ST,BLUE,DLX,2/PK,40PK/CS: Brand: MEDLINE

## (undated) DEVICE — NON-ADHERENT STRIPS,OIL EMULSION: Brand: CURITY

## (undated) DEVICE — BANDAGE ROLL,100% COTTON, 6 PLY, LARGE: Brand: KERLIX

## (undated) DEVICE — DRESSING ADAPTIC L16XW3IN OIL ADH

## (undated) DEVICE — BLADE SAW SAGITTAL 19.5

## (undated) DEVICE — CONTAINER,SPECIMEN,OR STERILE,4OZ: Brand: MEDLINE

## (undated) DEVICE — HEWSON SUTURE RETRIEVER: Brand: HEWSON SUTURE RETRIEVER

## (undated) NOTE — Clinical Note
Pt seen for cough x 2 in [facility]. PT must be seen by PCP within 2 days, as we were unable to get CXR coordinated at the end of visit. Pt responded well to Albuterol Neb. Pt exposed to old ceiling tiles that had water damage in rehab project at a school.  Was no

## (undated) NOTE — ED AVS SNAPSHOT
Heraclio Feng   MRN: [id number]    Department:  [facility] Emergency Department   Date of Visit:  12/7/2018           Disclosure     Insurance plans vary and the physician(s) referred by the ER may not be covered by your plan.  Please contact y CARE PHYSICIAN AT ONCE OR RETURN IMMEDIATELY TO THE EMERGENCY DEPARTMENT. If you have been prescribed any medication(s), please fill your prescription right away and begin taking the medication(s) as directed.   If you believe that any of the medications

## (undated) NOTE — ED AVS SNAPSHOT
William Delgado   MRN: [id number]    Department:  [facility] Emergency Department   Date of Visit:  2/22/2019           Disclosure     Insurance plans vary and the physician(s) referred by the ER may not be covered by your plan.  Please contact y CARE PHYSICIAN AT ONCE OR RETURN IMMEDIATELY TO THE EMERGENCY DEPARTMENT. If you have been prescribed any medication(s), please fill your prescription right away and begin taking the medication(s) as directed.   If you believe that any of the medications